# Patient Record
Sex: MALE | Race: WHITE | NOT HISPANIC OR LATINO | Employment: FULL TIME | URBAN - METROPOLITAN AREA
[De-identification: names, ages, dates, MRNs, and addresses within clinical notes are randomized per-mention and may not be internally consistent; named-entity substitution may affect disease eponyms.]

---

## 2017-11-17 ENCOUNTER — OFFICE VISIT (OUTPATIENT)
Dept: ORTHOPEDIC SURGERY | Facility: CLINIC | Age: 52
End: 2017-11-17

## 2017-11-17 VITALS
DIASTOLIC BLOOD PRESSURE: 98 MMHG | BODY MASS INDEX: 28.23 KG/M2 | WEIGHT: 220 LBS | HEIGHT: 74 IN | SYSTOLIC BLOOD PRESSURE: 127 MMHG | HEART RATE: 61 BPM

## 2017-11-17 DIAGNOSIS — I83.93 VARICOSE VEINS OF BOTH LOWER EXTREMITIES: ICD-10-CM

## 2017-11-17 DIAGNOSIS — M76.61 TENDONITIS, ACHILLES, RIGHT: Primary | ICD-10-CM

## 2017-11-17 PROCEDURE — 99203 OFFICE O/P NEW LOW 30 MIN: CPT | Performed by: PHYSICIAN ASSISTANT

## 2017-11-17 NOTE — PROGRESS NOTES
Patient: Oral Singleton  : 1965    Primary Care Provider: Holden Flores MD    Requesting Provider: As above    Pain of the Right Ankle      History    Chief Complaint: Right achilles pain    History of Present Illness: This is a very pleasant 52-year-old male presenting today to discuss his greater than 1 year history of right Achilles tendon pain.  He denies any trauma or injury.  He complains of pain with weightbearing and walking, better with rest.  He has stiffness when first getting up from a seated position and after driving long periods.  He reports occasional radiating pain up the calf.  He describes the pain as both sharp and throbbing.  He reports occasional warmth and erythema over the bump.  He had seen podiatry who recommended that he get a boot but he is unable to due to his work.  He has tried different types of shoe wear and inserts without any improved pain.  He is here looking for a plan to give him pain relief.        No Known Allergies  No current outpatient prescriptions on file prior to visit.     No current facility-administered medications on file prior to visit.      Social History     Social History   • Marital status: Unknown     Spouse name: N/A   • Number of children: N/A   • Years of education: N/A     Occupational History   • Not on file.     Social History Main Topics   • Smoking status: Never Smoker   • Smokeless tobacco: Never Used   • Alcohol use Yes      Comment: casual   • Drug use: No   • Sexual activity: Defer     Other Topics Concern   • Not on file     Social History Narrative   • No narrative on file     Past Surgical History:   Procedure Laterality Date   • SHOULDER SURGERY Left     rotator cuff repair     Family History   Problem Relation Age of Onset   • Cancer Mother    • Cancer Brother      Past Medical History:   Diagnosis Date   • No known problems          Review of Systems   Constitutional: Negative.    HENT: Negative.    Eyes: Negative.   "  Respiratory: Negative.    Cardiovascular: Negative.    Gastrointestinal: Negative.    Endocrine: Negative.    Genitourinary: Negative.    Musculoskeletal:        Ankle pain   Skin: Negative.    Allergic/Immunologic: Negative.    Neurological: Negative.    Hematological: Negative.    Psychiatric/Behavioral: Negative.        The following portions of the patient's history were reviewed and updated as appropriate: allergies, current medications, past family history, past medical history, past social history, past surgical history and problem list.    Objective   /98  Pulse 61  Ht 74\" (188 cm)  Wt 220 lb (99.8 kg)  BMI 28.25 kg/m2    Physical Exam:   GENERAL: Body habitus: normal weight for height    Lower extremity edema: Left: trace; Right: trace    Varicose veins:  Left: moderate; Right: moderate    Gait: antalgic     Mental Status:  awake and alert; oriented to person, place, and time    Voice:  clear  SKIN:  Normal    Hair Growth:  Right:normal; Left:  normal  HEENT: Head: Normocephalic, no lesions, without obvious abnormality.     Eyes: sclera anicteric  PULM:  Repiratory effort normal    Ortho Exam  V:  Dorsalis Pedis:  Right: 2+; Left:2+    Posterior Tibial: Right:2+; Left:2+    Capillary Refill:  Brisk  MSK:  Hand:right handed      Tibia:  Right:  non tender; Left:  non tender      Ankle:  Right: tender over the insertion of the achilles tendon, ROM  normal and motor function  normal; Left:  non tender, ROM  normal and motor function  normal      Foot:  Right:  non tender; Left:  non tender      NEURO: Heel Walking:  Right:  normal; Left:  normal    Toe Walking:  Right:  normal; Left:  normal     [place] 5.07 monofilament test: normal    Lower extremity sensation: intact     Calf Atrophy:none    Motor Function: all 5/5          Medical Decision Making    Data Review:   none    Assessment and Plan/ Diagnosis/Treatment options:   1.  Right insertional Achilles tendonitis, retrocalcaneal " bursitis for more than a year treated with shoe wear change without improved pain.  I have explained the problem to the patient in detail.  It is generally thought to be an overuse syndrome or due to chronic aging change.  I explained that it is NOT due to a “spur”.  The osteophyte (“spur”) often visible on x-ray is not the source of the problem: Many, many people have been same x-ray finding and did not have Achilles pain.  The problem causing the pain is the chronic tendinitis, inflammation, wear and tear of the tendon where it goes into the bone.    The bump on the heel NOT go away, the goal of the treatment is to have pain resolve.    Literature shows it is best treated with a 12 week course of physical therapy and night splinting.  I have shown the patient the 3 stretches to do at home, and recommend they do them 5 reps, 6-8 times per day.  I explained that injections and orthotics will not help.  I wrote a prescription for physical therapy, and we explained where to obtain a night splint..    Follow-up in 4 months if not improved.    If not improved, I explained the next step is a short leg walking cast for 6 weeks.    Surgery is a last resort as it is only helpful in about 60% of the time in improving the pain from this problem.    Patient will begin PT, stretching and night splinting and return for casting if needed.    2.  Patient has varicose veins.  I explained how that can lead to swelling, increase pain, stiffness, tiredness, cramping.  It can lead to chronic skin changes, sores on the leg.  I would recommend bilateral knee high low to moderate pressure support stockings.  We discussed sources and types.  He has a long family history of varicose veins and complications from them putting him at increased risk.        I examined the patient and discussed the plan with Ms Jaime, patient and his wife. LTD

## 2017-12-13 ENCOUNTER — HOSPITAL ENCOUNTER (OUTPATIENT)
Dept: PHYSICAL THERAPY | Facility: HOSPITAL | Age: 52
Setting detail: THERAPIES SERIES
Discharge: HOME | End: 2017-12-13

## 2017-12-13 DIAGNOSIS — M76.61 TENDONITIS, ACHILLES, RIGHT: Primary | ICD-10-CM

## 2017-12-13 PROCEDURE — 97161 PT EVAL LOW COMPLEX 20 MIN: CPT | Performed by: PHYSICAL THERAPIST

## 2017-12-13 PROCEDURE — 97140 MANUAL THERAPY 1/> REGIONS: CPT | Performed by: PHYSICAL THERAPIST

## 2017-12-13 NOTE — THERAPY EVALUATION
Outpatient Physical Therapy Ortho Initial Evaluation  [facility]     Patient Name: Oral Singleton  : 1965  MRN: [id number]  Today's Date: 2017      Visit Date: 2017    Patient Active Problem List   Diagnosis   • Tendonitis, Achilles, right   • Varicose veins of both lower extremities        Past Medical History:   Diagnosis Date   • No known problems         Past Surgical History:   Procedure Laterality Date   • SHOULDER SURGERY Left     rotator cuff repair       Visit Dx:     ICD-10-CM ICD-9-CM   1. Tendonitis, Achilles, right M76.61 726.71             Patient History       17 0900          History    Chief Complaint Pain  -LILLIAN      Type of Pain Ankle pain   Right Achilles  -LILLIAN      Date Current Problem(s) Began 16  -LILLIAN      Brief Description of Current Complaint This 52 year-old male reports at least a one year history of R Achilles pain  -LILLIAN      Onset Date- PT 17  -LILLIAN      Patient/Caregiver Goals Relieve pain  -LILLIAN      Hand Dominance right-handed  -LILLIAN      Occupation/sports/leisure activities Pt. owns garage door business.  He used to run but had to quit due to symptoms.  -LILLIAN      How has patient tried to help current problem? Night splint for the past month  -LILLIAN      Pain     Pain Location Ankle   R Achilles tendon  -LILLIAN      Pain at Present 4  -LILLIAN      Pain at Best 2  -LILLIAN      Pain at Worst 9  -LILLIAN      What Performance Factors Make the Current Problem(s) WORSE? work, lifting, climbing ladders  -LILLIAN      Daily Activities    Primary Language English  -LILLIAN      Teaching needs identified Home Exercise Program;Management of Condition  -LILLIAN      Pt Participated in POC and Goals Yes  -LILLIAN      Safety    Are you being hurt, hit, or frightened by anyone at home or in your life? No  -LILLIAN      Are you being neglected by a caregiver No  -LILLIAN        User Key  (r) = Recorded By, (t) = Taken By, (c) = Cosigned By    Initials Name Provider Type    LILLIAN Perkins PT Physical Therapist                 PT Ortho       12/13/17 1100    Foot/Ankle Palpation    Achilles' Tendon Right:;Tender;Swollen  -LILLIAN    ROM (Range of Motion)    General ROM Detail Bilateral ankle ROM and claf flexibility are WNL's.  -LILLIAN    MMT (Manual Muscle Testing)    General MMT Assessment Detail Bilateral ankle strength is WNL's.  -LILLIAN    Lower Extremity Flexibility    Gastrocnemius --   +6 degrees bilaterally  -LILLIAN      User Key  (r) = Recorded By, (t) = Taken By, (c) = Cosigned By    Initials Name Provider Type    LILLIAN Perkins PT Physical Therapist                      Therapy Education  Given: HEP  Program: New  How Provided: Verbal, Demonstration, Written  Provided to: Patient  Level of Understanding: Demonstrated, Verbalized           PT OP Goals       12/13/17 1100       PT Short Term Goals    STG Date to Achieve 01/10/18  -LILLIAN     STG 1 Pt. demonstrates independence in HEP.  -LILLIAN     STG 2 Pt. reports reduction in pain frequency and intensity compared to baseline levels.  -LILLIAN     Long Term Goals    LTG Date to Achieve 02/07/18  -LILLIAN     LTG 1 Pt. is independent in ongoing self-management of remaining symptoms.  -LILLIAN     LTG 2 Achilles pain is occasional and mild.  -LILLIAN     LTG 3 LEFS score is improved to >60/80.  -LILLIAN     Time Calculation    PT Goal Re-Cert Due Date 03/13/18  -LILLIAN       User Key  (r) = Recorded By, (t) = Taken By, (c) = Cosigned By    Initials Name Provider Type    LILLIAN Perkins PT Physical Therapist                PT Assessment/Plan       12/13/17 1140       PT Assessment    Functional Limitations Limitations in functional capacity and performance  -LILLIAN     Impairments Pain;Edema;Gait  -LILLIAN     Assessment Comments Pt. presents with chronic R Achilles tendinitis affecting functional abilities.  He will benefit from PT intervention to address pain, swelling, and functional limitations.  -LILLIAN     Please refer to paper survey for additional self-reported information Yes  -LILLIAN     Rehab Potential Good  -LILLIAN      Patient/caregiver participated in establishment of treatment plan and goals Yes  -LILLIAN     Patient would benefit from skilled therapy intervention Yes  -LILLIAN     PT Plan    PT Frequency 2x/week  -LILLIAN     Predicted Duration of Therapy Intervention (days/wks) 24 visits per MD recommendation  -LILLIAN     Planned CPT's? PT EVAL LOW COMPLEXITY: 84705;PT ULTRASOUND EA 15 MIN: 88860;PT MANUAL THERAPY EA 15 MIN: 09565;PT NEUROMUSC RE-EDUCATION EA 15 MIN: 88391;PT THER PROC EA 15 MIN: 42276;PT IONTOPHORESIS EA 15 MIN: 07948;PT HOT/COLD PACK WC NONMCARE: 21614  -LILLIAN     PT Plan Comments PT 2x/week per POC.  -LILLIAN       User Key  (r) = Recorded By, (t) = Taken By, (c) = Cosigned By    Initials Name Provider Type    LILLIAN Perkins, ELIA Physical Therapist                Modalities       12/13/17 1100          Iontophoresis 42260    Rx Minutes 5   R lateral Achilles tendon at insertion.  -LILLIAN      Dexamethasone used Yes  -LILLIAN      Patch Type --   Activa extended wear  -LILLIAN        User Key  (r) = Recorded By, (t) = Taken By, (c) = Cosigned By    Initials Name Provider Type    LILLIAN Perkins, PT Physical Therapist             Manual Rx (last 36 hours)      Manual Treatments       12/13/17 1100          Manual Rx 1    Manual Rx 1 Location R calf and ankle  -LILLIAN      Manual Rx 1 Type Astym  -LILLIAN        User Key  (r) = Recorded By, (t) = Taken By, (c) = Cosigned By    Initials Name Provider Type    LILLIAN Perkins PT Physical Therapist                      Outcome Measure Options: Lower Extremity Functional Scale (LEFS)  Lower Extremity Functional Index  Any of your usual work, housework or school activities: Moderate difficulty  Your usual hobbies, recreational or sporting activities: A little bit of difficulty  Getting into or out of the bath: No difficulty  Walking between rooms: No difficulty  Putting on your shoes or socks: No difficulty  Squatting: No difficulty  Lifting an object, like a bag of groceries from the floor: No  difficulty  Performing light activities around your home: A little bit of difficulty  Performing heavy activities around your home: A little bit of difficulty  Getting into or out of a car: No difficulty  Walking 2 blocks: Quite a bit of difficulty  Walking a mile: Quite a bit of difficulty  Going up or down 10 stairs (about 1 flight of stairs): A little bit of difficulty  Standing for 1 hour: No difficulty  Sitting for 1 hour: No difficulty  Running on even ground: Quite a bit of difficulty  Running on uneven ground: Quite a bit of difficulty  Making sharp turns while running fast: Quite a bit of difficulty  Hopping: Quite a bit of difficulty  Rolling over in bed: No difficulty  Total: 56      Time Calculation:   Start Time: 0945  Total Timed Code Minutes- PT: 15 minute(s)     Therapy Charges for Today     Code Description Service Date Service Provider Modifiers Qty    82885887224 HC PT EVAL LOW COMPLEXITY 2 12/13/2017 Neris Perkins, PT GP 1    71229226077 HC PT MANUAL THERAPY EA 15 MIN 12/13/2017 Neris Perkins, PT GP 1          PT G-Codes  Outcome Measure Options: Lower Extremity Functional Scale (LEFS)         Neris Perkins PT  12/13/2017

## 2017-12-15 ENCOUNTER — HOSPITAL ENCOUNTER (OUTPATIENT)
Dept: PHYSICAL THERAPY | Facility: HOSPITAL | Age: 52
Setting detail: THERAPIES SERIES
Discharge: HOME | End: 2017-12-15

## 2017-12-15 DIAGNOSIS — M76.61 TENDONITIS, ACHILLES, RIGHT: Primary | ICD-10-CM

## 2017-12-15 PROCEDURE — 97110 THERAPEUTIC EXERCISES: CPT | Performed by: PHYSICAL THERAPIST

## 2017-12-15 PROCEDURE — 97010 HOT OR COLD PACKS THERAPY: CPT | Performed by: PHYSICAL THERAPIST

## 2017-12-15 PROCEDURE — 97140 MANUAL THERAPY 1/> REGIONS: CPT | Performed by: PHYSICAL THERAPIST

## 2017-12-15 NOTE — THERAPY TREATMENT NOTE
Outpatient Physical Therapy Ortho Treatment Note  [facility]     Patient Name: Oral Singleton  : 1965  MRN: [id number]  Today's Date: 12/15/2017      Visit Date: 12/15/2017    Visit Dx:    ICD-10-CM ICD-9-CM   1. Tendonitis, Achilles, right M76.61 726.71       Patient Active Problem List   Diagnosis   • Tendonitis, Achilles, right   • Varicose veins of both lower extremities        Past Medical History:   Diagnosis Date   • No known problems         Past Surgical History:   Procedure Laterality Date   • SHOULDER SURGERY Left     rotator cuff repair             PT Ortho       12/15/17 1000    Subjective Comments    Subjective Comments Pt. did a lot yesterday.  He reports soreness in his R Achilles this morning.  -LILLIAN    Subjective Pain    Able to rate subjective pain? yes  -LILLIAN    Pre-Treatment Pain Level 6  -LILLIAN      17 1100    Foot/Ankle Palpation    Achilles' Tendon Right:;Tender;Swollen  -LILLIAN    ROM (Range of Motion)    General ROM Detail Bilateral ankle ROM and claf flexibility are WNL's.  -LILLIAN    MMT (Manual Muscle Testing)    General MMT Assessment Detail Bilateral ankle strength is WNL's.  -LILLIAN    Lower Extremity Flexibility    Gastrocnemius --   +6 degrees bilaterally  -LILLIAN      User Key  (r) = Recorded By, (t) = Taken By, (c) = Cosigned By    Initials Name Provider Type    LILLIAN Perkins PT Physical Therapist                            PT Assessment/Plan       12/15/17 1000       PT Assessment    Assessment Comments No significant change in symptoms yet.  -LILLIAN     PT Plan    PT Plan Comments Continue PT.  -LILLIAN       User Key  (r) = Recorded By, (t) = Taken By, (c) = Cosigned By    Initials Name Provider Type    LILLIAN Perkins PT Physical Therapist                Modalities       12/15/17 1000          Ice    Ice Applied Yes  -LILLIAN      Location R Achilles, lateral insertion  -LILLIAN      Rx Minutes --   5 minutes  -LILLIAN      Iontophoresis 48115    Rx Minutes 5  -LILLIAN      Dexamethasone used  Yes  -LILLIAN      Patch Type --   Activa  -LILLIAN        User Key  (r) = Recorded By, (t) = Taken By, (c) = Cosigned By    Initials Name Provider Type    LILLIAN Perkins PT Physical Therapist                Exercises       12/15/17 1000          Subjective Comments    Subjective Comments Pt. did a lot yesterday.  He reports soreness in his R Achilles this morning.  -LILLIAN      Subjective Pain    Able to rate subjective pain? yes  -LILLIAN      Pre-Treatment Pain Level 6  -LILLIAN      Exercise 1    Exercise Name 1 No changes to HEP.  Pt. will continue with stretching.  Provided 1 cm. heel wedges for each shoe in an effort to reduce repetitive stress on Achilles tendon through the day.  Active warm-up on bicycle prior to Astym.  -LILLIAN        User Key  (r) = Recorded By, (t) = Taken By, (c) = Cosigned By    Initials Name Provider Type    LILLIAN Perkins PT Physical Therapist                        Manual Rx (last 36 hours)      Manual Treatments       12/15/17 0900          Manual Rx 1    Manual Rx 1 Location R calf and ankle  -LILLIAN      Manual Rx 1 Type Astym   Provided written info. regarding Astym.  -LILLIAN        User Key  (r) = Recorded By, (t) = Taken By, (c) = Cosigned By    Initials Name Provider Type    LILLIAN Perkins PT Physical Therapist                             Time Calculation:   Start Time: 1015  Total Timed Code Minutes- PT: 25 minute(s)    Therapy Charges for Today     Code Description Service Date Service Provider Modifiers Qty    1966640 HC PT HOT/COLD PACK WC NONMCARE 12/15/2017 Neris Perkins, PT GP 1    81512841250 HC PT MANUAL THERAPY EA 15 MIN 12/15/2017 Neris Perkins PT GP 1    42815912889 HC PT THER PROC EA 15 MIN 12/15/2017 Neris Perkins PT GP 1                    Neris Perkins PT  12/15/2017

## 2017-12-20 ENCOUNTER — HOSPITAL ENCOUNTER (OUTPATIENT)
Dept: PHYSICAL THERAPY | Facility: HOSPITAL | Age: 52
Setting detail: THERAPIES SERIES
Discharge: HOME | End: 2017-12-20

## 2017-12-20 DIAGNOSIS — M76.61 TENDONITIS, ACHILLES, RIGHT: Primary | ICD-10-CM

## 2017-12-20 PROCEDURE — 97140 MANUAL THERAPY 1/> REGIONS: CPT | Performed by: PHYSICAL THERAPIST

## 2017-12-20 NOTE — THERAPY TREATMENT NOTE
Outpatient Physical Therapy Ortho Treatment Note  [facility]     Patient Name: Oral Singleton  : 1965  MRN: [id number]  Today's Date: 2017      Visit Date: 2017    Visit Dx:    ICD-10-CM ICD-9-CM   1. Tendonitis, Achilles, right M76.61 726.71       Patient Active Problem List   Diagnosis   • Tendonitis, Achilles, right   • Varicose veins of both lower extremities        Past Medical History:   Diagnosis Date   • No known problems         Past Surgical History:   Procedure Laterality Date   • SHOULDER SURGERY Left     rotator cuff repair             PT Ortho       17 1400    Subjective Comments    Subjective Comments Not as bad today.  -LILLIAN    Subjective Pain    Able to rate subjective pain? yes  -LILLIAN    Pre-Treatment Pain Level 5  -LILLIAN    Post-Treatment Pain Level 5  -LILLIAN      User Key  (r) = Recorded By, (t) = Taken By, (c) = Cosigned By    Initials Name Provider Type    LILLIAN Perkins, PT Physical Therapist                            PT Assessment/Plan       17 1400       PT Assessment    Assessment Comments Slight decrease in pain intensity noted today.    -LILLIAN     PT Plan    PT Plan Comments Continue PT.  -LILLIAN       User Key  (r) = Recorded By, (t) = Taken By, (c) = Cosigned By    Initials Name Provider Type    LILLIAN Perkins, PT Physical Therapist                Modalities       17 1400          Iontophoresis 51759    Rx Minutes 5  -LILLIAN      Dexamethasone used Yes  -LILLIAN      Patch Type --   Activa  -LILLIAN        User Key  (r) = Recorded By, (t) = Taken By, (c) = Cosigned By    Initials Name Provider Type    LILLIAN Perkins, PT Physical Therapist                Exercises       17 1400          Subjective Comments    Subjective Comments Not as bad today.  -LILLIAN      Subjective Pain    Able to rate subjective pain? yes  -LILLIAN      Pre-Treatment Pain Level 5  -LILLIAN      Post-Treatment Pain Level 5  -LILLIAN      Exercise 1    Exercise Name 1 Instructed pt. in calcaneal  "rocking to reduce strain on Achilles insertion.  Also instructed in \"muscle bending\" at tendinous portion of Achilles.  -LILLIAN        User Key  (r) = Recorded By, (t) = Taken By, (c) = Cosigned By    Initials Name Provider Type    LILLIAN Perkins PT Physical Therapist                        Manual Rx (last 36 hours)      Manual Treatments       12/20/17 1300          Manual Rx 1    Manual Rx 1 Location R calf and ankle  -LILLIAN      Manual Rx 1 Type Astym and STM  -LILLIAN        User Key  (r) = Recorded By, (t) = Taken By, (c) = Cosigned By    Initials Name Provider Type    LILLIAN Perkins PT Physical Therapist                             Time Calculation:   Start Time: 1415  Total Timed Code Minutes- PT: 25 minute(s)    Therapy Charges for Today     Code Description Service Date Service Provider Modifiers Qty    72513325447  PT MANUAL THERAPY EA 15 MIN 12/20/2017 Neris Perkins PT GP 2                    Neris Perkins PT  12/20/2017     "

## 2018-01-02 ENCOUNTER — HOSPITAL ENCOUNTER (OUTPATIENT)
Dept: PHYSICAL THERAPY | Facility: HOSPITAL | Age: 53
Setting detail: THERAPIES SERIES
Discharge: HOME | End: 2018-01-02

## 2018-01-02 DIAGNOSIS — M76.61 TENDONITIS, ACHILLES, RIGHT: Primary | ICD-10-CM

## 2018-01-02 PROCEDURE — 97140 MANUAL THERAPY 1/> REGIONS: CPT | Performed by: PHYSICAL THERAPIST

## 2018-01-02 PROCEDURE — 97010 HOT OR COLD PACKS THERAPY: CPT | Performed by: PHYSICAL THERAPIST

## 2018-01-02 NOTE — THERAPY TREATMENT NOTE
Outpatient Physical Therapy Ortho Treatment Note  [facility]     Patient Name: Oral Singleton  : 1965  MRN: [id number]  Today's Date: 2018      Visit Date: 2018    Visit Dx:    ICD-10-CM ICD-9-CM   1. Tendonitis, Achilles, right M76.61 726.71       Patient Active Problem List   Diagnosis   • Tendonitis, Achilles, right   • Varicose veins of both lower extremities        Past Medical History:   Diagnosis Date   • No known problems         Past Surgical History:   Procedure Laterality Date   • SHOULDER SURGERY Left     rotator cuff repair             PT Ortho       18 1600    Subjective Comments    Subjective Comments Pt. did a lot of walking during .  This included city walking as well as rugged terrain.  He reports no significant change in symptoms.  -LILLIAN    Subjective Pain    Able to rate subjective pain? yes  -LILLIAN    Pre-Treatment Pain Level 6  -LILLIAN    Post-Treatment Pain Level 6  -LILLIAN      User Key  (r) = Recorded By, (t) = Taken By, (c) = Cosigned By    Initials Name Provider Type    LILLIAN Perkins PT Physical Therapist                            PT Assessment/Plan       18 1600       PT Assessment    Assessment Comments Symptom intensity essentially unchanged, after extensive walking while on vacation.  -LILLIAN     PT Plan    PT Plan Comments Continue PT.  -LILLIAN       User Key  (r) = Recorded By, (t) = Taken By, (c) = Cosigned By    Initials Name Provider Type    LILLIAN Perkins PT Physical Therapist                Modalities       18 1600          Ice    Ice Applied Yes   ice massage  -LILLIAN      Location R Achilles, lateral insertion  -LILLIAN      Iontophoresis 14587    Rx Minutes 5  -LILLIAN      Dexamethasone used Yes  -LILLIAN      Patch Type --   Activa  -LILLIAN        User Key  (r) = Recorded By, (t) = Taken By, (c) = Cosigned By    Initials Name Provider Type    LILLIAN Perkins PT Physical Therapist                Exercises       18 1600           Subjective Comments    Subjective Comments Pt. did a lot of walking during [place] break.  This included city walking as well as rugged terrain.  He reports no significant change in symptoms.  -LILLIAN      Subjective Pain    Able to rate subjective pain? yes  -LILLIAN      Pre-Treatment Pain Level 6  -LILLIAN      Post-Treatment Pain Level 6  -LILLIAN        User Key  (r) = Recorded By, (t) = Taken By, (c) = Cosigned By    Initials Name Provider Type    LILLIAN Perkins PT Physical Therapist                        Manual Rx (last 36 hours)      Manual Treatments       01/02/18 1500          Manual Rx 1    Manual Rx 1 Location R calf and ankle  -LILLIAN      Manual Rx 1 Type Astym and STM  -LILLIAN        User Key  (r) = Recorded By, (t) = Taken By, (c) = Cosigned By    Initials Name Provider Type    LILLIAN Perkins PT Physical Therapist                             Time Calculation:   Start Time: 1530  Total Timed Code Minutes- PT: 15 minute(s)    Therapy Charges for Today     Code Description Service Date Service Provider Modifiers Qty    47436833844 HC PT MANUAL THERAPY EA 15 MIN 1/2/2018 Neris Perkins, PT GP 1    67898594373 HC PT HOT/COLD PACK WC NONMCARE 1/2/2018 Neris Perkins PT GP 1                    Neris Perkins PT  1/2/2018

## 2018-01-04 ENCOUNTER — HOSPITAL ENCOUNTER (OUTPATIENT)
Dept: PHYSICAL THERAPY | Facility: HOSPITAL | Age: 53
Setting detail: THERAPIES SERIES
Discharge: HOME | End: 2018-01-04

## 2018-01-04 DIAGNOSIS — M76.61 TENDONITIS, ACHILLES, RIGHT: Primary | ICD-10-CM

## 2018-01-04 PROCEDURE — 97010 HOT OR COLD PACKS THERAPY: CPT | Performed by: PHYSICAL THERAPIST

## 2018-01-04 PROCEDURE — 97140 MANUAL THERAPY 1/> REGIONS: CPT | Performed by: PHYSICAL THERAPIST

## 2018-01-04 NOTE — THERAPY TREATMENT NOTE
Outpatient Physical Therapy Ortho Treatment Note  [facility]     Patient Name: Oral Singleton  : 1965  MRN: [id number]  Today's Date: 2018      Visit Date: 2018    Visit Dx:    ICD-10-CM ICD-9-CM   1. Tendonitis, Achilles, right M76.61 726.71       Patient Active Problem List   Diagnosis   • Tendonitis, Achilles, right   • Varicose veins of both lower extremities        Past Medical History:   Diagnosis Date   • No known problems         Past Surgical History:   Procedure Laterality Date   • SHOULDER SURGERY Left     rotator cuff repair             PT Ortho       18 1600    Subjective Comments    Subjective Comments Pt. has been working long days in cold temperatures, but he reports reduction in pain intensity today compared to previous sessions.  -LILLIAN    Subjective Pain    Able to rate subjective pain? yes  -LILLIAN    Pre-Treatment Pain Level 4  -LILLIAN    Post-Treatment Pain Level 4  -LILLIAN      18 1600    Subjective Comments    Subjective Comments Pt. did a lot of walking during  break.  This included city walking as well as rugged terrain.  He reports no significant change in symptoms.  -LILLIAN    Subjective Pain    Able to rate subjective pain? yes  -LILLIAN    Pre-Treatment Pain Level 6  -LILLIAN    Post-Treatment Pain Level 6  -LILLIAN      User Key  (r) = Recorded By, (t) = Taken By, (c) = Cosigned By    Initials Name Provider Type    LILLIAN Perkins PT Physical Therapist                            PT Assessment/Plan       18 1600       PT Assessment    Assessment Comments Decreased intensity of pain reported today.  -LILLIAN     PT Plan    PT Plan Comments Continue PT.  -LILLIAN       User Key  (r) = Recorded By, (t) = Taken By, (c) = Cosigned By    Initials Name Provider Type    LILLIAN Perkins PT Physical Therapist                Modalities       18 1600          Moist Heat    MH Applied Yes  -LILLIAN      Location R Achilles and lower calf  -LILLIAN      Rx Minutes 10 mins  -LILLIAN       Iontophoresis 30186    Rx Minutes 5  -LILLIAN      Dexamethasone used Yes  -LILLIAN      Patch Type --   Activa  -LILLIAN        User Key  (r) = Recorded By, (t) = Taken By, (c) = Cosigned By    Initials Name Provider Type    LILLIAN Perkins PT Physical Therapist                Exercises       01/04/18 1600          Subjective Comments    Subjective Comments Pt. has been working long days in cold temperatures, but he reports reduction in pain intensity today compared to previous sessions.  -LILLIAN      Subjective Pain    Able to rate subjective pain? yes  -LILLIAN      Pre-Treatment Pain Level 4  -LILLIAN      Post-Treatment Pain Level 4  -LILLIAN        User Key  (r) = Recorded By, (t) = Taken By, (c) = Cosigned By    Initials Name Provider Type    LILLIAN Perkins PT Physical Therapist                        Manual Rx (last 36 hours)      Manual Treatments       01/04/18 1500          Manual Rx 1    Manual Rx 1 Location R calf and ankle  -LILLIAN      Manual Rx 1 Type Astym and STM  -LILLIAN        User Key  (r) = Recorded By, (t) = Taken By, (c) = Cosigned By    Initials Name Provider Type    LILLIAN Perkins PT Physical Therapist                             Time Calculation:   Start Time: 1535  Total Timed Code Minutes- PT: 15 minute(s)    Therapy Charges for Today     Code Description Service Date Service Provider Modifiers Qty    15350343959 HC PT MANUAL THERAPY EA 15 MIN 1/4/2018 Neris Perkins, PT GP 1    66802645324 HC PT HOT/COLD PACK WC NONMCARE 1/4/2018 Neris Perkins, PT GP 1                    Neris Perkins PT  1/4/2018

## 2018-01-11 ENCOUNTER — HOSPITAL ENCOUNTER (OUTPATIENT)
Dept: PHYSICAL THERAPY | Facility: HOSPITAL | Age: 53
Setting detail: THERAPIES SERIES
Discharge: HOME | End: 2018-01-11

## 2018-01-11 DIAGNOSIS — M76.61 TENDONITIS, ACHILLES, RIGHT: Primary | ICD-10-CM

## 2018-01-11 PROCEDURE — 97140 MANUAL THERAPY 1/> REGIONS: CPT | Performed by: PHYSICAL THERAPIST

## 2018-01-11 PROCEDURE — 97110 THERAPEUTIC EXERCISES: CPT | Performed by: PHYSICAL THERAPIST

## 2018-01-11 PROCEDURE — 97010 HOT OR COLD PACKS THERAPY: CPT | Performed by: PHYSICAL THERAPIST

## 2018-01-11 NOTE — THERAPY PROGRESS REPORT/RE-CERT
"    Outpatient Physical Therapy Ortho Re-Assessment  [facility]     Patient Name: Oral Singleton  : 1965  MRN: [id number]  Today's Date: 2018      Visit Date: 2018    Patient Active Problem List   Diagnosis   • Tendonitis, Achilles, right   • Varicose veins of both lower extremities        Past Medical History:   Diagnosis Date   • No known problems         Past Surgical History:   Procedure Laterality Date   • SHOULDER SURGERY Left     rotator cuff repair       Visit Dx:     ICD-10-CM ICD-9-CM   1. Tendonitis, Achilles, right M76.61 726.71             Patient History       18 1600          Pain     Pain at Present 5  -LILLIAN      Pain at Best 3  -LILLIAN      Pain at Worst 7  -LILLIAN        User Key  (r) = Recorded By, (t) = Taken By, (c) = Cosigned By    Initials Name Provider Type    LILLIAN Perkins, ELIA Physical Therapist                PT Ortho       18 1600    Subjective Comments    Subjective Comments Pt. notices some improvement.  Pain can still get pretty intense depending on what he is doing.  He says he does not always get around to doing his stretches.  \"it is the last thing I want to do when I get home from work.\"  Morning are too hectic to get them done.  -LILLIAN    Subjective Pain    Able to rate subjective pain? yes  -LILLIAN    Pre-Treatment Pain Level 5  -LILLIAN    Post-Treatment Pain Level 4  -LILLIAN      User Key  (r) = Recorded By, (t) = Taken By, (c) = Cosigned By    Initials Name Provider Type    LILLIAN Perkins PT Physical Therapist                      Therapy Education  Given: HEP  Program: Progressed  How Provided: Verbal, Demonstration, Written  Provided to: Patient  Level of Understanding: Verbalized, Demonstrated           PT OP Goals       18 1600       PT Short Term Goals    STG Date to Achieve 01/10/18  -LILLIAN     STG 1 Pt. demonstrates independence in HEP.  -LILLIAN     STG 1 Progress Partially Met  -LILLIAN     STG 2 Pt. reports reduction in pain frequency and intensity " compared to baseline levels.  -LILLIAN     STG 2 Progress Progressing;Ongoing  -LILLIAN     Long Term Goals    LTG Date to Achieve 02/07/18  -     LTG 1 Pt. is independent in ongoing self-management of remaining symptoms.  -LILLIAN     LTG 1 Progress Ongoing  -LILLIAN     LTG 2 Achilles pain is occasional and mild.  -LILLIAN     LTG 2 Progress Ongoing  -LILLIAN     LTG 3 LEFS score is improved to >60/80.  -LILLIAN     LTG 3 Progress Ongoing  -       User Key  (r) = Recorded By, (t) = Taken By, (c) = Cosigned By    Initials Name Provider Type    LILLIAN Perkins PT Physical Therapist                PT Assessment/Plan       01/11/18 1655       PT Assessment    Functional Limitations Limitations in functional capacity and performance  -LILLIAN     Impairments Pain;Edema;Gait  -LILLIAN     Assessment Comments Pt. notices some improvement with pain becoming less intense at times, but can still be intense depending on level of activity at work.  -     Please refer to paper survey for additional self-reported information Yes  -LILLIAN     Rehab Potential Good  -LILLIAN     Patient/caregiver participated in establishment of treatment plan and goals Yes  -LILLIAN     Patient would benefit from skilled therapy intervention Yes  -LILLIAN     PT Plan    PT Frequency 2x/week  -LILLIAN     Predicted Duration of Therapy Intervention (days/wks) 16 visits  -LILLIAN     Planned CPT's? PT EVAL LOW COMPLEXITY: 30548;PT ULTRASOUND EA 15 MIN: 53843;PT MANUAL THERAPY EA 15 MIN: 19095;PT NEUROMUSC RE-EDUCATION EA 15 MIN: 66612;PT THER PROC EA 15 MIN: 39578;PT IONTOPHORESIS EA 15 MIN: 06527;PT HOT/COLD PACK WC NONMCARE: 17360  -LILLIAN     PT Plan Comments Continue PT.    -       User Key  (r) = Recorded By, (t) = Taken By, (c) = Cosigned By    Initials Name Provider Type    LILLIAN Perkins PT Physical Therapist                Modalities       01/11/18 1600          Ice    Ice Applied Yes   ice massage  -      Location R Achilles, lateral insertion  -      Iontophoresis 47562    Rx Minutes 5   R  "Achilles insertion  -LILLIAN      Dexamethasone used Yes  -LILLIAN      Patch Type --   Activa  -LILLIAN        User Key  (r) = Recorded By, (t) = Taken By, (c) = Cosigned By    Initials Name Provider Type    LILLIAN Perkins PT Physical Therapist              Exercises       01/11/18 1600          Subjective Comments    Subjective Comments Pt. notices some improvement.  Pain can still get pretty intense depending on what he is doing.  He says he does not always get around to doing his stretches.  \"it is the last thing I want to do when I get home from work.\"  Morning are too hectic to get them done.  -LILLIAN      Subjective Pain    Able to rate subjective pain? yes  -LILLIAN      Pre-Treatment Pain Level 5  -LILLIAN      Post-Treatment Pain Level 4  -LILLIAN      Exercise 1    Exercise Name 1 Brief reassessment and addition of standing gastroc and soleus stretches.  Pt. is encouraged to do these intermittently through the day.  -LILLIAN        User Key  (r) = Recorded By, (t) = Taken By, (c) = Cosigned By    Initials Name Provider Type    LILLIAN Perkins PT Physical Therapist                        Outcome Measure Options: Lower Extremity Functional Scale (LEFS)  Lower Extremity Functional Index  Any of your usual work, housework or school activities: A little bit of difficulty  Your usual hobbies, recreational or sporting activities: Moderate difficulty  Getting into or out of the bath: No difficulty  Walking between rooms: No difficulty  Putting on your shoes or socks: No difficulty  Squatting: No difficulty  Lifting an object, like a bag of groceries from the floor: No difficulty  Performing light activities around your home: No difficulty  Performing heavy activities around your home: Moderate difficulty  Getting into or out of a car: No difficulty  Walking 2 blocks: Moderate difficulty  Walking a mile: Moderate difficulty  Going up or down 10 stairs (about 1 flight of stairs): Moderate difficulty  Standing for 1 hour: Moderate " difficulty  Sitting for 1 hour: A little bit of difficulty  Running on even ground: Extreme difficulty or unable to perform activity  Running on uneven ground: Extreme difficulty or unable to perform activity  Making sharp turns while running fast: Extreme difficulty or unable to perform activity  Hopping: Extreme difficulty or unable to perform activity  Rolling over in bed: A little bit of difficulty  Total: 49      Time Calculation:   Start Time: 1535  Total Timed Code Minutes- PT: 30 minute(s)     Therapy Charges for Today     Code Description Service Date Service Provider Modifiers Qty    03826029291 HC PT THER PROC EA 15 MIN 1/11/2018 Neris Perkins, PT GP 1    21547688609 HC PT MANUAL THERAPY EA 15 MIN 1/11/2018 Neris Perkins, PT GP 1    33167964982 HC PT HOT/COLD PACK WC NONMCARE 1/11/2018 Neris Perkins, PT GP 1          PT G-Codes  Outcome Measure Options: Lower Extremity Functional Scale (LEFS)         Neris Pekrins, PT  1/11/2018

## 2018-01-18 ENCOUNTER — HOSPITAL ENCOUNTER (OUTPATIENT)
Dept: PHYSICAL THERAPY | Facility: HOSPITAL | Age: 53
Setting detail: THERAPIES SERIES
Discharge: HOME | End: 2018-01-18

## 2018-01-18 DIAGNOSIS — M76.61 TENDONITIS, ACHILLES, RIGHT: Primary | ICD-10-CM

## 2018-01-18 PROCEDURE — 97010 HOT OR COLD PACKS THERAPY: CPT | Performed by: PHYSICAL THERAPIST

## 2018-01-18 PROCEDURE — 97140 MANUAL THERAPY 1/> REGIONS: CPT | Performed by: PHYSICAL THERAPIST

## 2018-01-18 NOTE — THERAPY TREATMENT NOTE
Outpatient Physical Therapy Ortho Initial Evaluation  [facility]     Patient Name: Oral Singleton  : 1965  MRN: [id number]  Today's Date: 2018      Visit Date: 2018    Patient Active Problem List   Diagnosis   • Tendonitis, Achilles, right   • Varicose veins of both lower extremities        Past Medical History:   Diagnosis Date   • No known problems         Past Surgical History:   Procedure Laterality Date   • SHOULDER SURGERY Left     rotator cuff repair       Visit Dx:     ICD-10-CM ICD-9-CM   1. Tendonitis, Achilles, right M76.61 726.71                                           PT Assessment/Plan       18 1600       PT Assessment    Assessment Comments Pt. is more consistent with performance of stretching exercises.  He is not wearing night splint because he kp it is painful.  He will see if he can adjust it so that it is tolerable during the night.  -LILLIAN     PT Plan    PT Plan Comments Continue PT.  -LILLIAN       User Key  (r) = Recorded By, (t) = Taken By, (c) = Cosigned By    Initials Name Provider Type    LILLIAN Perkins PT Physical Therapist                Modalities       18 1600          Ice    Ice Applied Yes   ice massage  -LILLIAN      Location R Achilles, lateral insertion  -LILLIAN      Iontophoresis 08122    Dexamethasone used Yes  -LILLIAN      Patch Type --   Activa  -LILLIAN        User Key  (r) = Recorded By, (t) = Taken By, (c) = Cosigned By    Initials Name Provider Type    LILLIAN Perkins PT Physical Therapist              Exercises       18 1600          Subjective Comments    Subjective Comments Pain is localized and persistent, but today, near end of work day, pain is mild to moderate.  -LILLIAN      Subjective Pain    Able to rate subjective pain? yes  -LILLIAN      Pre-Treatment Pain Level 4  -LILLIAN      Post-Treatment Pain Level 4  -LILLIAN        User Key  (r) = Recorded By, (t) = Taken By, (c) = Cosigned By    Initials Name Provider Type    LILLIAN Perkins PT  Physical Therapist           Manual Rx (last 36 hours)      Manual Treatments       01/18/18 1500          Manual Rx 1    Manual Rx 1 Location R calf and ankle  -LILLIAN      Manual Rx 1 Type Astym and STM  -LILLIAN      Manual Rx 2    Manual Rx 2 Location R calcaneus  -LILLIAN      Manual Rx 2 Type passive eversion to reduce strain on lateral insertion  -LILLIAN        User Key  (r) = Recorded By, (t) = Taken By, (c) = Cosigned By    Initials Name Provider Type    LILLIAN Perkins PT Physical Therapist                                Time Calculation:   Start Time: 1545  Total Timed Code Minutes- PT: 20 minute(s)     Therapy Charges for Today     Code Description Service Date Service Provider Modifiers Qty    66525808626 HC PT MANUAL THERAPY EA 15 MIN 1/18/2018 Neris Perkins, PT GP 1    45219924120 HC PT HOT/COLD PACK WC NONMCARE 1/18/2018 Neris Perkins, PT GP 1                    Neris Perkins, PT  1/18/2018

## 2018-01-23 ENCOUNTER — HOSPITAL ENCOUNTER (OUTPATIENT)
Dept: PHYSICAL THERAPY | Facility: HOSPITAL | Age: 53
Setting detail: THERAPIES SERIES
Discharge: HOME | End: 2018-01-23

## 2018-01-23 DIAGNOSIS — M76.61 TENDONITIS, ACHILLES, RIGHT: Primary | ICD-10-CM

## 2018-01-23 PROCEDURE — 97110 THERAPEUTIC EXERCISES: CPT | Performed by: PHYSICAL THERAPIST

## 2018-01-23 PROCEDURE — 97140 MANUAL THERAPY 1/> REGIONS: CPT | Performed by: PHYSICAL THERAPIST

## 2018-01-23 NOTE — THERAPY TREATMENT NOTE
Outpatient Physical Therapy Ortho Treatment Note  [facility]     Patient Name: Oral Singleton  : 1965  MRN: [id number]  Today's Date: 2018      Visit Date: 2018    Visit Dx:    ICD-10-CM ICD-9-CM   1. Tendonitis, Achilles, right M76.61 726.71       Patient Active Problem List   Diagnosis   • Tendonitis, Achilles, right   • Varicose veins of both lower extremities        Past Medical History:   Diagnosis Date   • No known problems         Past Surgical History:   Procedure Laterality Date   • SHOULDER SURGERY Left     rotator cuff repair             PT Ortho       18 1600    Subjective Comments    Subjective Comments Pt. says pain is back to original level of intensity.  He is frustrated.    -LILLIAN    Subjective Pain    Able to rate subjective pain? yes  -LILLIAN    Pre-Treatment Pain Level 6  -LILLIAN    Post-Treatment Pain Level 6  -LILLIAN      User Key  (r) = Recorded By, (t) = Taken By, (c) = Cosigned By    Initials Name Provider Type    LILLIAN Perkins PT Physical Therapist                            PT Assessment/Plan       18 1600       PT Assessment    Assessment Comments Pt. is wearing elastic bandage inside night splint to improve comfort.  Tape applied today was tolerable as pt. left clinic.  He will wear until next appointment later this week unless it becomes irritating.  -LILLIAN     PT Plan    PT Plan Comments Continue PT.  Assess effectiveness of taping and transition to more active exercise.  -LILLIAN       User Key  (r) = Recorded By, (t) = Taken By, (c) = Cosigned By    Initials Name Provider Type    LILLIAN Perkins, PT Physical Therapist                    Exercises       18 1600          Subjective Comments    Subjective Comments Pt. says pain is back to original level of intensity.  He is frustrated.    -LILLIAN      Subjective Pain    Able to rate subjective pain? yes  -LILLIAN      Pre-Treatment Pain Level 6  -LILLIAN      Post-Treatment Pain Level 6  -LILLIAN      Exercise 1     Exercise Name 1 Reviewed stretching.  Pt. indicates that he is stretching regularly, but also expresses concern about doing more damage.  Reinforced that stretching should be tolerable and sustained for at least 30 seconds per repetition.  Performed passive stretching in clinic today.  Ended with trial of taping to lateral Achilles to reduce strain at lateral insertion.  -LILLIAN        User Key  (r) = Recorded By, (t) = Taken By, (c) = Cosigned By    Initials Name Provider Type    LILLIAN Perkins PT Physical Therapist                        Manual Rx (last 36 hours)      Manual Treatments       01/23/18 1500          Manual Rx 1    Manual Rx 1 Location R Achilles insertion  -LILLIAN      Manual Rx 1 Type Astym  -LILLIAN      Manual Rx 2    Manual Rx 2 Location R calcaneus  -LILLIAN      Manual Rx 2 Type passive eversion to reduce strain on lateral insertion  -LILLIAN        User Key  (r) = Recorded By, (t) = Taken By, (c) = Cosigned By    Initials Name Provider Type    LILLIAN Perkins PT Physical Therapist                             Time Calculation:   Start Time: 1540  Total Timed Code Minutes- PT: 25 minute(s)    Therapy Charges for Today     Code Description Service Date Service Provider Modifiers Qty    17671181284  PT THER PROC EA 15 MIN 1/23/2018 Neris Perkins PT GP 1    [id number]  PT MANUAL THERAPY EA 15 MIN 1/23/2018 Neris Perkins PT GP 1                    Neris Perkins PT  1/23/2018

## 2018-01-25 ENCOUNTER — HOSPITAL ENCOUNTER (OUTPATIENT)
Dept: PHYSICAL THERAPY | Facility: HOSPITAL | Age: 53
Setting detail: THERAPIES SERIES
Discharge: HOME | End: 2018-01-25

## 2018-01-25 DIAGNOSIS — M76.61 TENDONITIS, ACHILLES, RIGHT: Primary | ICD-10-CM

## 2018-01-25 PROCEDURE — 97110 THERAPEUTIC EXERCISES: CPT | Performed by: PHYSICAL THERAPIST

## 2018-01-25 PROCEDURE — 97140 MANUAL THERAPY 1/> REGIONS: CPT | Performed by: PHYSICAL THERAPIST

## 2018-01-25 NOTE — THERAPY TREATMENT NOTE
"    Outpatient Physical Therapy Ortho Treatment Note  [facility]     Patient Name: Oral Singleton  : 1965  MRN: [id number]  Today's Date: 2018      Visit Date: 2018    Visit Dx:    ICD-10-CM ICD-9-CM   1. Tendonitis, Achilles, right M76.61 726.71       Patient Active Problem List   Diagnosis   • Tendonitis, Achilles, right   • Varicose veins of both lower extremities        Past Medical History:   Diagnosis Date   • No known problems         Past Surgical History:   Procedure Laterality Date   • SHOULDER SURGERY Left     rotator cuff repair             PT Ortho       18 1500    Subjective Comments    Subjective Comments Pain back to 4/10 today and it is \"not that throbbing pain\" like it was 2 days ago.  Pt. has resumed wearing night splint and has also tried wearing walking boot when he can.  He is not wearing work boots today, but is wearing a loafer, thinking this may reduce the pressure on his heel.  -LILLIAN    Subjective Pain    Able to rate subjective pain? yes  -LILLIAN    Pre-Treatment Pain Level 4  -LILLIAN    Post-Treatment Pain Level 4  -LILLIAN      18 1600    Subjective Comments    Subjective Comments Pt. says pain is back to original level of intensity.  He is frustrated.    -LILLIAN    Subjective Pain    Able to rate subjective pain? yes  -LILLIAN    Pre-Treatment Pain Level 6  -LILLIAN    Post-Treatment Pain Level 6  -LILLIAN      User Key  (r) = Recorded By, (t) = Taken By, (c) = Cosigned By    Initials Name Provider Type    LILLIAN Perkins, PT Physical Therapist                            PT Assessment/Plan       18 1500       PT Assessment    Assessment Comments Pain decreased compared to last visit, back to 4/10 which has been the lowest pain rating throughout treatment.  -LILLIAN     PT Plan    PT Plan Comments Continue PT.  If tape is helpful, educate pt. in self-taping.  -LILLIAN       User Key  (r) = Recorded By, (t) = Taken By, (c) = Cosigned By    Initials Name Provider Type    LILLIAN SIMPSON" "ELIA Perkins Physical Therapist                    Exercises       01/25/18 1500          Subjective Comments    Subjective Comments Pain back to 4/10 today and it is \"not that throbbing pain\" like it was 2 days ago.  Pt. has resumed wearing night splint and has also tried wearing walking boot when he can.  He is not wearing work boots today, but is wearing a loafer, thinking this may reduce the pressure on his heel.  -LILLIAN      Subjective Pain    Able to rate subjective pain? yes  -LILLIAN      Pre-Treatment Pain Level 4  -LILLIAN      Post-Treatment Pain Level 4  -LILLIAN      Exercise 1    Exercise Name 1 Repeated taping to reduce strain on lateral Achilles insertion.  -LILLIAN        User Key  (r) = Recorded By, (t) = Taken By, (c) = Cosigned By    Initials Name Provider Type    LILLIAN Perkins PT Physical Therapist                        Manual Rx (last 36 hours)      Manual Treatments       01/25/18 1500          Manual Rx 1    Manual Rx 1 Location R Achilles insertion  -LILLIAN      Manual Rx 1 Type Astym  -LILLIAN      Manual Rx 2    Manual Rx 2 Location R calcaneus  -LILLIAN      Manual Rx 2 Type passive eversion to reduce strain on lateral insertion  -LILLIAN        User Key  (r) = Recorded By, (t) = Taken By, (c) = Cosigned By    Initials Name Provider Type    LILLIAN Perkins PT Physical Therapist                             Time Calculation:   Start Time: 1515  Total Timed Code Minutes- PT: 30 minute(s)    Therapy Charges for Today     Code Description Service Date Service Provider Modifiers Qty    72104625626  PT THER PROC EA 15 MIN 1/25/2018 Neris Perkins, PT GP 1    [id number]  PT MANUAL THERAPY EA 15 MIN 1/25/2018 Neris Perkins PT GP 1                    Neris Perkins PT  1/25/2018     "

## 2018-01-30 ENCOUNTER — HOSPITAL ENCOUNTER (OUTPATIENT)
Dept: PHYSICAL THERAPY | Facility: HOSPITAL | Age: 53
Setting detail: THERAPIES SERIES
Discharge: HOME | End: 2018-01-30

## 2018-01-30 DIAGNOSIS — M76.61 TENDONITIS, ACHILLES, RIGHT: Primary | ICD-10-CM

## 2018-01-30 PROCEDURE — 97140 MANUAL THERAPY 1/> REGIONS: CPT | Performed by: PHYSICAL THERAPIST

## 2018-01-30 NOTE — THERAPY TREATMENT NOTE
Outpatient Physical Therapy Ortho Treatment Note  [facility]     Patient Name: Oral Singleton  : 1965  MRN: [id number]  Today's Date: 2018      Visit Date: 2018    Visit Dx:    ICD-10-CM ICD-9-CM   1. Tendonitis, Achilles, right M76.61 726.71       Patient Active Problem List   Diagnosis   • Tendonitis, Achilles, right   • Varicose veins of both lower extremities        Past Medical History:   Diagnosis Date   • No known problems         Past Surgical History:   Procedure Laterality Date   • SHOULDER SURGERY Left     rotator cuff repair             PT Ortho       18 1600    Subjective Comments    Subjective Comments Pt. reports reduction in pain intensity recently.  He has resumed use of walking boot and is sleeping in night splint.  He no longer has the throbbing pain.  The area is  to pressure.  -LILLIAN    Subjective Pain    Able to rate subjective pain? yes  -LILLIAN    Pre-Treatment Pain Level 3  -LILLIAN    Post-Treatment Pain Level 3  -LILLIAN      User Key  (r) = Recorded By, (t) = Taken By, (c) = Cosigned By    Initials Name Provider Type    LILLIAN Perkins PT Physical Therapist                            PT Assessment/Plan       18 1600       PT Assessment    Assessment Comments Lateral Achilles insertion appears less inflammed, not as red as when pt. was wearing work boots.  Calcaneal mobility continues to improve.  -LILLIAN     PT Plan    PT Plan Comments Continue PT.  -LILLIAN       User Key  (r) = Recorded By, (t) = Taken By, (c) = Cosigned By    Initials Name Provider Type    LILLIAN Perkins PT Physical Therapist                Modalities       18 1600          Iontophoresis 75216    Dexamethasone used Yes  -LILLIAN      Patch Type --   Activa  -LILLIAN        User Key  (r) = Recorded By, (t) = Taken By, (c) = Cosigned By    Initials Name Provider Type    LILLIAN Perkins PT Physical Therapist                Exercises       18 1600          Subjective Comments     Subjective Comments Pt. reports reduction in pain intensity recently.  He has resumed use of walking boot and is sleeping in night splint.  He no longer has the throbbing pain.  The area is  to pressure.  -LILLIAN      Subjective Pain    Able to rate subjective pain? yes  -LILLIAN      Pre-Treatment Pain Level 3  -LILLIAN      Post-Treatment Pain Level 3  -LILLIAN        User Key  (r) = Recorded By, (t) = Taken By, (c) = Cosigned By    Initials Name Provider Type    LILLIAN Perkins PT Physical Therapist                        Manual Rx (last 36 hours)      Manual Treatments       01/30/18 1500          Manual Rx 1    Manual Rx 1 Location R Achilles insertion  -LILLIAN      Manual Rx 1 Type Astym  -LILLIAN      Manual Rx 2    Manual Rx 2 Location R calcaneus  -LILLIAN      Manual Rx 2 Type passive eversion to reduce strain on lateral insertion  -LILLIAN        User Key  (r) = Recorded By, (t) = Taken By, (c) = Cosigned By    Initials Name Provider Type    LILLIAN Perkins PT Physical Therapist                             Time Calculation:   Start Time: 1515  Total Timed Code Minutes- PT: 25 minute(s)    Therapy Charges for Today     Code Description Service Date Service Provider Modifiers Qty    [id number]  PT MANUAL THERAPY EA 15 MIN 1/30/2018 Neris Perkins PT GP 2                    Neris Perkins PT  1/30/2018

## 2018-02-01 ENCOUNTER — HOSPITAL ENCOUNTER (OUTPATIENT)
Dept: PHYSICAL THERAPY | Facility: HOSPITAL | Age: 53
Setting detail: THERAPIES SERIES
Discharge: HOME | End: 2018-02-01

## 2018-02-01 DIAGNOSIS — M76.61 TENDONITIS, ACHILLES, RIGHT: Primary | ICD-10-CM

## 2018-02-01 PROCEDURE — 97110 THERAPEUTIC EXERCISES: CPT | Performed by: PHYSICAL THERAPIST

## 2018-02-01 PROCEDURE — 97140 MANUAL THERAPY 1/> REGIONS: CPT | Performed by: PHYSICAL THERAPIST

## 2018-02-01 NOTE — THERAPY TREATMENT NOTE
Outpatient Physical Therapy Ortho Treatment Note  [facility]     Patient Name: Oral Singleton  : 1965  MRN: [id number]  Today's Date: 2018      Visit Date: 2018    Visit Dx:    ICD-10-CM ICD-9-CM   1. Tendonitis, Achilles, right M76.61 726.71       Patient Active Problem List   Diagnosis   • Tendonitis, Achilles, right   • Varicose veins of both lower extremities        Past Medical History:   Diagnosis Date   • No known problems         Past Surgical History:   Procedure Laterality Date   • SHOULDER SURGERY Left     rotator cuff repair             PT Ortho       18 1600    Subjective Comments    Subjective Comments Pain remains mild to moderate.  Pt. is continuing with multiple self-management strategies in addition to treatment in clinic.  -LILLIAN    Subjective Pain    Able to rate subjective pain? yes  -LILLIAN    Pre-Treatment Pain Level 3  -LILLIAN    Post-Treatment Pain Level 3  -LILLIAN      18 1600    Subjective Comments    Subjective Comments Pt. reports reduction in pain intensity recently.  He has resumed use of walking boot and is sleeping in night splint.  He no longer has the throbbing pain.  The area is  to pressure.  -LILLIAN    Subjective Pain    Able to rate subjective pain? yes  -LILLIAN    Pre-Treatment Pain Level 3  -LILLIAN    Post-Treatment Pain Level 3  -LILLIAN      User Key  (r) = Recorded By, (t) = Taken By, (c) = Cosigned By    Initials Name Provider Type    LILLIAN Perkins PT Physical Therapist                            PT Assessment/Plan       18 1600       PT Assessment    Assessment Comments Pain remains mild to moderate with multiple treatment approaches applied.  -LILLIAN     PT Plan    PT Plan Comments Continue PT.  -LILLIAN       User Key  (r) = Recorded By, (t) = Taken By, (c) = Cosigned By    Initials Name Provider Type    LILLIAN Perkins PT Physical Therapist                Modalities       18 1600          Iontophoresis 98671    Dexamethasone used Yes   -LILLIAN      Patch Type --   Activa  -LILLIAN        User Key  (r) = Recorded By, (t) = Taken By, (c) = Cosigned By    Initials Name Provider Type    LILLIAN Perkins PT Physical Therapist                Exercises       02/01/18 1600          Subjective Comments    Subjective Comments Pain remains mild to moderate.  Pt. is continuing with multiple self-management strategies in addition to treatment in clinic.  -LILLIAN      Subjective Pain    Able to rate subjective pain? yes  -LILLIAN      Pre-Treatment Pain Level 3  -LILLIAN      Post-Treatment Pain Level 3  -LILLIAN      Exercise 1    Exercise Name 1 Taping to reduce strain on lateral Achilles insertion.  -LILLIAN        User Key  (r) = Recorded By, (t) = Taken By, (c) = Cosigned By    Initials Name Provider Type    LILLIAN Perkins PT Physical Therapist                        Manual Rx (last 36 hours)      Manual Treatments       02/01/18 1500          Manual Rx 1    Manual Rx 1 Location R Achilles insertion  -LILLIAN      Manual Rx 1 Type Astym  -LILLIAN      Manual Rx 2    Manual Rx 2 Location R calcaneus  -LILLIAN      Manual Rx 2 Type passive eversion to reduce strain on lateral insertion  -LILLIAN        User Key  (r) = Recorded By, (t) = Taken By, (c) = Cosigned By    Initials Name Provider Type    LILLIAN Perkins PT Physical Therapist                             Time Calculation:   Start Time: 1530  Total Timed Code Minutes- PT: 25 minute(s)    Therapy Charges for Today     Code Description Service Date Service Provider Modifiers Qty    95555122927  PT MANUAL THERAPY EA 15 MIN 2/1/2018 Neris Perkins, PT GP 1    [id number]  PT THER PROC EA 15 MIN 2/1/2018 Neris Perkins PT GP 1                    Neris Perkins PT  2/1/2018

## 2018-02-06 ENCOUNTER — HOSPITAL ENCOUNTER (OUTPATIENT)
Dept: PHYSICAL THERAPY | Facility: HOSPITAL | Age: 53
Setting detail: THERAPIES SERIES
Discharge: HOME | End: 2018-02-06

## 2018-02-06 DIAGNOSIS — M76.61 TENDONITIS, ACHILLES, RIGHT: Primary | ICD-10-CM

## 2018-02-06 PROCEDURE — 97110 THERAPEUTIC EXERCISES: CPT | Performed by: PHYSICAL THERAPIST

## 2018-02-06 PROCEDURE — 97140 MANUAL THERAPY 1/> REGIONS: CPT | Performed by: PHYSICAL THERAPIST

## 2018-02-06 NOTE — THERAPY TREATMENT NOTE
Outpatient Physical Therapy Ortho Treatment Note  [facility]     Patient Name: Oral Singleton  : 1965  MRN: [id number]  Today's Date: 2018      Visit Date: 2018    Visit Dx:    ICD-10-CM ICD-9-CM   1. Tendonitis, Achilles, right M76.61 726.71       Patient Active Problem List   Diagnosis   • Tendonitis, Achilles, right   • Varicose veins of both lower extremities        Past Medical History:   Diagnosis Date   • No known problems         Past Surgical History:   Procedure Laterality Date   • SHOULDER SURGERY Left     rotator cuff repair             PT Ortho       18 1500    Subjective Comments    Subjective Comments Pain remains mild.  Pt. continues with self-management strategies.  -LILLIAN    Subjective Pain    Able to rate subjective pain? yes  -LILLIAN    Pre-Treatment Pain Level 3  -LILLIAN    Post-Treatment Pain Level 3  -LILLIAN      User Key  (r) = Recorded By, (t) = Taken By, (c) = Cosigned By    Initials Name Provider Type    LILLIAN Perkins, PT Physical Therapist                            PT Assessment/Plan       18 1500       PT Assessment    Assessment Comments Symptoms mild, stable recently without major exacerbation.  -LILLIAN     PT Plan    PT Plan Comments Continue PT.  Reassess status and assess response to newlt prescribed exercises.  -LILLIAN       User Key  (r) = Recorded By, (t) = Taken By, (c) = Cosigned By    Initials Name Provider Type    LILLIAN Perkins, ELIA Physical Therapist                Modalities       18 1500          Iontophoresis 93089    Rx Minutes 5   R lateral Achilles tendon  -LILLIAN      Dexamethasone used Yes  -LILLIAN      Patch Type --   Activa  -LILLIAN        User Key  (r) = Recorded By, (t) = Taken By, (c) = Cosigned By    Initials Name Provider Type    LILLIAN Perkins PT Physical Therapist                Exercises       18 1500          Subjective Comments    Subjective Comments Pain remains mild.  Pt. continues with self-management strategies.  -LILLIAN       Subjective Pain    Able to rate subjective pain? yes  -LILLIAN      Pre-Treatment Pain Level 3  -LILLIAN      Post-Treatment Pain Level 3  -LILLIAN      Exercise 1    Exercise Name 1 Repeated tapint to reduce strain on lateral Achilles.  Added eccentric strengthening to HEP.  -LILLIAN        User Key  (r) = Recorded By, (t) = Taken By, (c) = Cosigned By    Initials Name Provider Type    LILLIAN Perkins PT Physical Therapist                        Manual Rx (last 36 hours)      Manual Treatments       02/06/18 1500          Manual Rx 1    Manual Rx 1 Location R Achilles   -LILLIAN      Manual Rx 1 Type Astym and STM  -LILLIAN      Manual Rx 2    Manual Rx 2 Location R calcaneus  -LILLIAN      Manual Rx 2 Type passive eversion to reduce strain on lateral insertion  -LILLIAN        User Key  (r) = Recorded By, (t) = Taken By, (c) = Cosigned By    Initials Name Provider Type    LILLIAN Perkins PT Physical Therapist                             Time Calculation:   Start Time: 1520  Total Timed Code Minutes- PT: 25 minute(s)    Therapy Charges for Today     Code Description Service Date Service Provider Modifiers Qty    32269878466  PT THER PROC EA 15 MIN 2/6/2018 Neris Perkins PT GP 1    [id number]  PT MANUAL THERAPY EA 15 MIN 2/6/2018 Neris Perkins PT GP 1                    Neris Perkins PT  2/6/2018

## 2018-02-08 ENCOUNTER — HOSPITAL ENCOUNTER (OUTPATIENT)
Dept: PHYSICAL THERAPY | Facility: HOSPITAL | Age: 53
Setting detail: THERAPIES SERIES
Discharge: HOME | End: 2018-02-08

## 2018-02-08 DIAGNOSIS — M76.61 TENDONITIS, ACHILLES, RIGHT: Primary | ICD-10-CM

## 2018-02-08 PROCEDURE — 97110 THERAPEUTIC EXERCISES: CPT | Performed by: PHYSICAL THERAPIST

## 2018-02-08 PROCEDURE — 97140 MANUAL THERAPY 1/> REGIONS: CPT | Performed by: PHYSICAL THERAPIST

## 2018-02-08 NOTE — THERAPY PROGRESS REPORT/RE-CERT
Outpatient Physical Therapy Ortho Re-Assessment  [facility]     Patient Name: Orla Singleton  : 1965  MRN: [id number]  Today's Date: 2018      Visit Date: 2018    Patient Active Problem List   Diagnosis   • Tendonitis, Achilles, right   • Varicose veins of both lower extremities        Past Medical History:   Diagnosis Date   • No known problems         Past Surgical History:   Procedure Laterality Date   • SHOULDER SURGERY Left     rotator cuff repair       Visit Dx:     ICD-10-CM ICD-9-CM   1. Tendonitis, Achilles, right M76.61 726.71             Patient History       18 1600          Pain     Pain at Present 3  -LILLIAN      Pain at Best 0   at rest  -LILLIAN        User Key  (r) = Recorded By, (t) = Taken By, (c) = Cosigned By    Initials Name Provider Type    LILLIAN Perkins, ELIA Physical Therapist                PT Ortho       18 1600    Subjective Comments    Subjective Comments Pt. reports no change in pain since last session 2 days ago.  He continues self-management strategies.    -LILLIAN    Subjective Pain    Able to rate subjective pain? yes  -LILLIAN    Pre-Treatment Pain Level 3  -LILLIAN    Post-Treatment Pain Level 2  -LILLIAN      18 1500    Subjective Comments    Subjective Comments Pain remains mild.  Pt. continues with self-management strategies.  -LILLIAN    Subjective Pain    Able to rate subjective pain? yes  -LILLIAN    Pre-Treatment Pain Level 3  -LILLIAN    Post-Treatment Pain Level 3  -LILLIAN      User Key  (r) = Recorded By, (t) = Taken By, (c) = Cosigned By    Initials Name Provider Type    LILLIAN Perkins PT Physical Therapist                      Therapy Education  Given: HEP  Program: Reinforced  How Provided: Verbal  Provided to: Patient  Level of Understanding: Verbalized           PT OP Goals       18 1600       PT Short Term Goals    STG Date to Achieve 01/10/18  -     STG 1 Pt. demonstrates independence in HEP.  -LILLIAN     STG 1 Progress Met  -LILLIAN     STG 2 Pt. reports  reduction in pain frequency and intensity compared to baseline levels.  -     STG 2 Progress Met  -     Long Term Goals    LTG Date to Achieve 02/07/18  -     LTG 1 Pt. is independent in ongoing self-management of remaining symptoms.  -LILLIAN     LTG 1 Progress Progressing;Ongoing  -LILLIAN     LTG 2 Achilles pain is occasional and mild.  -LILLIAN     LTG 2 Progress Progressing;Ongoing  -LILLIAN     LTG 3 LEFS score is improved to >60/80.  -     LTG 3 Progress Progressing;Ongoing  -       User Key  (r) = Recorded By, (t) = Taken By, (c) = Cosigned By    Initials Name Provider Type    LILLIAN Perkins PT Physical Therapist                PT Assessment/Plan       02/08/18 1634       PT Assessment    Functional Limitations Limitations in functional capacity and performance  -     Impairments Pain  -     Assessment Comments Pt. indicates that he at times is not aware of pain if at rest.  Pain intensity is decreasing gradually with multiple self-management and clinical interventions.  He will benefit from continuing PT intervention to maximum therapeutic benefit.  -     Please refer to paper survey for additional self-reported information Yes  -LILLIAN     Rehab Potential Good  -LILLIAN     Patient/caregiver participated in establishment of treatment plan and goals Yes  -LILLIAN     Patient would benefit from skilled therapy intervention Yes  -LILLIAN     PT Plan    PT Frequency 2x/week  -     Predicted Duration of Therapy Intervention (days/wks) up to 8 visits  -     Planned CPT's? PT EVAL LOW COMPLEXITY: 39025;PT MANUAL THERAPY EA 15 MIN: 22244;PT NEUROMUSC RE-EDUCATION EA 15 MIN: 56233;PT THER PROC EA 15 MIN: 85659;PT IONTOPHORESIS EA 15 MIN: 47972  -     PT Plan Comments Continue PT per POC.  -       User Key  (r) = Recorded By, (t) = Taken By, (c) = Cosigned By    Initials Name Provider Type    LILLIAN Perkins PT Physical Therapist                Modalities       02/08/18 1600          Iontophoresis 59477    Rx Minutes  5   R lateral Achilles  -LILLIAN      Dexamethasone used Yes  -LILLIAN      Patch Type --   Activa  -LILLIAN        User Key  (r) = Recorded By, (t) = Taken By, (c) = Cosigned By    Initials Name Provider Type    LILLIAN Perkins PT Physical Therapist              Exercises       02/08/18 1600          Subjective Comments    Subjective Comments Pt. reports no change in pain since last session 2 days ago.  He continues self-management strategies.    -LILLIAN      Subjective Pain    Able to rate subjective pain? yes  -LILLIAN      Pre-Treatment Pain Level 3  -LILLIAN      Post-Treatment Pain Level 2  -LILLIAN      Exercise 1    Exercise Name 1 Brief reassessment completed today.  -LILLIAN        User Key  (r) = Recorded By, (t) = Taken By, (c) = Cosigned By    Initials Name Provider Type    LILLIAN Perkins PT Physical Therapist           Manual Rx (last 36 hours)      Manual Treatments       02/08/18 1500          Manual Rx 1    Manual Rx 1 Location R Achilles   -LILLIAN      Manual Rx 1 Type Astym and STM  -LILLIAN      Manual Rx 2    Manual Rx 2 Location R calcaneus  -LILLIAN      Manual Rx 2 Type passive eversion to reduce strain on lateral insertion  -LILLIAN        User Key  (r) = Recorded By, (t) = Taken By, (c) = Cosigned By    Initials Name Provider Type    LILLIAN Perkins PT Physical Therapist                      Outcome Measure Options: Lower Extremity Functional Scale (LEFS)  Lower Extremity Functional Index  Any of your usual work, housework or school activities: A little bit of difficulty  Your usual hobbies, recreational or sporting activities: Extreme difficulty or unable to perform activity  Getting into or out of the bath: No difficulty  Walking between rooms: No difficulty  Putting on your shoes or socks: No difficulty  Squatting: No difficulty  Lifting an object, like a bag of groceries from the floor: No difficulty  Performing light activities around your home: No difficulty  Performing heavy activities around your home: Moderate  difficulty  Getting into or out of a car: No difficulty  Walking 2 blocks: No difficulty  Walking a mile: Extreme difficulty or unable to perform activity  Going up or down 10 stairs (about 1 flight of stairs): No difficulty  Standing for 1 hour: No difficulty  Sitting for 1 hour: No difficulty  Running on even ground: Extreme difficulty or unable to perform activity  Running on uneven ground: Extreme difficulty or unable to perform activity  Making sharp turns while running fast: Extreme difficulty or unable to perform activity  Hopping: Extreme difficulty or unable to perform activity  Rolling over in bed: No difficulty  Total: 53      Time Calculation:   Start Time: 1530  Total Timed Code Minutes- PT: 30 minute(s)     Therapy Charges for Today     Code Description Service Date Service Provider Modifiers Qty    01085404374 HC PT THER PROC EA 15 MIN 2/8/2018 Neris Perkins, PT GP 1    [id number] HC PT MANUAL THERAPY EA 15 MIN 2/8/2018 Nreis Perkins, PT GP 1          PT G-Codes  Outcome Measure Options: Lower Extremity Functional Scale (LEFS)         Neris Perkins PT  2/8/2018

## 2018-02-13 ENCOUNTER — HOSPITAL ENCOUNTER (OUTPATIENT)
Dept: PHYSICAL THERAPY | Facility: HOSPITAL | Age: 53
Setting detail: THERAPIES SERIES
Discharge: HOME | End: 2018-02-13

## 2018-02-13 DIAGNOSIS — M76.61 TENDONITIS, ACHILLES, RIGHT: Primary | ICD-10-CM

## 2018-02-13 PROCEDURE — 97140 MANUAL THERAPY 1/> REGIONS: CPT | Performed by: PHYSICAL THERAPIST

## 2018-02-13 NOTE — THERAPY TREATMENT NOTE
Outpatient Physical Therapy Ortho Treatment Note  [facility]     Patient Name: Oral Singleton  : 1965  MRN: [id number]  Today's Date: 2018      Visit Date: 2018    Visit Dx:    ICD-10-CM ICD-9-CM   1. Tendonitis, Achilles, right M76.61 726.71       Patient Active Problem List   Diagnosis   • Tendonitis, Achilles, right   • Varicose veins of both lower extremities        Past Medical History:   Diagnosis Date   • No known problems         Past Surgical History:   Procedure Laterality Date   • SHOULDER SURGERY Left     rotator cuff repair             PT Ortho       18 1500    Subjective Comments    Subjective Comments Pt. reports persistent mild pain at R lateral Achilles insertion.  He continues with exercise and self-management strategies.  He denies increase in pain with exercise performance.  -LILLIAN    Subjective Pain    Able to rate subjective pain? yes  -LILLIAN    Pre-Treatment Pain Level 3  -LILLIAN    Post-Treatment Pain Level 2  -LILLIAN      User Key  (r) = Recorded By, (t) = Taken By, (c) = Cosigned By    Initials Name Provider Type    LILLIAN Perkins PT Physical Therapist                            PT Assessment/Plan       18 1500       PT Assessment    Assessment Comments Symptoms remain mild and localized.  -LILLIAN     PT Plan    PT Plan Comments Continue PT to maximum benefit.  -LILLIAN       User Key  (r) = Recorded By, (t) = Taken By, (c) = Cosigned By    Initials Name Provider Type    LILLIAN Perkins PT Physical Therapist                    Exercises       18 1500          Subjective Comments    Subjective Comments Pt. reports persistent mild pain at R lateral Achilles insertion.  He continues with exercise and self-management strategies.  He denies increase in pain with exercise performance.  -LILLIAN      Subjective Pain    Able to rate subjective pain? yes  -LILLIAN      Pre-Treatment Pain Level 3  -LILLIAN      Post-Treatment Pain Level 2  -LILLIAN      Exercise 1    Exercise Name 1  Taping to promote calcaneal eversion to reduce strain on lateral Achilles insertion.  -LILLIAN        User Key  (r) = Recorded By, (t) = Taken By, (c) = Cosigned By    Initials Name Provider Type    LILLIAN Perkins PT Physical Therapist                        Manual Rx (last 36 hours)      Manual Treatments       02/13/18 1500          Manual Rx 1    Manual Rx 1 Location R Achilles   -LILLIAN      Manual Rx 1 Type Astym and STM  -LILLIAN      Manual Rx 2    Manual Rx 2 Location R calcaneus  -LILLIAN      Manual Rx 2 Type passive eversion to reduce strain on lateral insertion  -LILLIAN        User Key  (r) = Recorded By, (t) = Taken By, (c) = Cosigned By    Initials Name Provider Type    LILLIAN Perkins PT Physical Therapist                             Time Calculation:   Start Time: 1515  Total Timed Code Minutes- PT: 25 minute(s)    Therapy Charges for Today     Code Description Service Date Service Provider Modifiers Qty    50394495239 HC PT MANUAL THERAPY EA 15 MIN 2/13/2018 Neris Perkins PT GP 2                    Neris Perkins PT  2/13/2018

## 2018-02-15 ENCOUNTER — HOSPITAL ENCOUNTER (OUTPATIENT)
Dept: PHYSICAL THERAPY | Facility: HOSPITAL | Age: 53
Setting detail: THERAPIES SERIES
Discharge: HOME | End: 2018-02-15

## 2018-02-15 DIAGNOSIS — M76.61 TENDONITIS, ACHILLES, RIGHT: Primary | ICD-10-CM

## 2018-02-15 PROCEDURE — 97140 MANUAL THERAPY 1/> REGIONS: CPT | Performed by: PHYSICAL THERAPIST

## 2018-02-15 PROCEDURE — 97110 THERAPEUTIC EXERCISES: CPT | Performed by: PHYSICAL THERAPIST

## 2018-02-15 NOTE — THERAPY TREATMENT NOTE
Outpatient Physical Therapy Ortho Treatment Note  [facility]     Patient Name: Oral Singleton  : 1965  MRN: [id number]  Today's Date: 2/15/2018      Visit Date: 02/15/2018    Visit Dx:    ICD-10-CM ICD-9-CM   1. Tendonitis, Achilles, right M76.61 726.71       Patient Active Problem List   Diagnosis   • Tendonitis, Achilles, right   • Varicose veins of both lower extremities        Past Medical History:   Diagnosis Date   • No known problems         Past Surgical History:   Procedure Laterality Date   • SHOULDER SURGERY Left     rotator cuff repair             PT Ortho       02/15/18 1600    Subjective Comments    Subjective Comments Pain remains mild.  Pt. feels ready to continue with self-management strategies independently.  -LILLIAN    Subjective Pain    Able to rate subjective pain? yes  -LILLIAN    Pre-Treatment Pain Level 3  -LILLIAN    Post-Treatment Pain Level 2  -LILLIAN      18 1500    Subjective Comments    Subjective Comments Pt. reports persistent mild pain at R lateral Achilles insertion.  He continues with exercise and self-management strategies.  He denies increase in pain with exercise performance.  -LILLIAN    Subjective Pain    Able to rate subjective pain? yes  -LILLIAN    Pre-Treatment Pain Level 3  -LILLIAN    Post-Treatment Pain Level 2  -      User Key  (r) = Recorded By, (t) = Taken By, (c) = Cosigned By    Initials Name Provider Type    LILLIAN Perkins PT Physical Therapist                            PT Assessment/Plan       02/15/18 1600       PT Assessment    Assessment Comments Symptoms remain mild.  Pt. is ready to continue with self-management and independent exercise.  -LILLIAN     PT Plan    PT Plan Comments Hold chart active for up to 30 days.  No additional visits are scheduled at this time.  -       User Key  (r) = Recorded By, (t) = Taken By, (c) = Cosigned By    Initials Name Provider Type    LILLIAN Perkins PT Physical Therapist                Modalities       02/15/18 1600           Iontophoresis 52625    Rx Minutes 5  -LILLIAN      Dexamethasone used Yes  -LILLIAN      Patch Type --   Activa  -LILLIAN        User Key  (r) = Recorded By, (t) = Taken By, (c) = Cosigned By    Initials Name Provider Type    LILLIAN Perkins PT Physical Therapist                Exercises       02/15/18 1600          Subjective Comments    Subjective Comments Pain remains mild.  Pt. feels ready to continue with self-management strategies independently.  -LILLIAN      Subjective Pain    Able to rate subjective pain? yes  -LILLIAN      Pre-Treatment Pain Level 3  -LILLIAN      Post-Treatment Pain Level 2  -LILLIAN      Exercise 1    Exercise Name 1 Repeated taping and reviewed technique with patient so that he can continue taping on his own.  He indicates independence in HEP for stretching and strengthening as well.  -LILLIAN        User Key  (r) = Recorded By, (t) = Taken By, (c) = Cosigned By    Initials Name Provider Type    LILLIAN Perkins PT Physical Therapist                        Manual Rx (last 36 hours)      Manual Treatments       02/15/18 1500          Manual Rx 1    Manual Rx 1 Location R Achilles   -LILLIAN      Manual Rx 1 Type Astym and STM  -LILLIAN      Manual Rx 2    Manual Rx 2 Location R calcaneus  -LILLIAN      Manual Rx 2 Type passive eversion to reduce strain on lateral insertion  -LILLIAN        User Key  (r) = Recorded By, (t) = Taken By, (c) = Cosigned By    Initials Name Provider Type    LILLIAN Perkins PT Physical Therapist                PT OP Goals       02/15/18 1600       PT Short Term Goals    STG Date to Achieve 01/10/18  -LILLIAN     STG 1 Pt. demonstrates independence in HEP.  -LILLIAN     STG 1 Progress Met  -LILLIAN     STG 2 Pt. reports reduction in pain frequency and intensity compared to baseline levels.  -     STG 2 Progress Met  -LILLIAN     Long Term Goals    LTG Date to Achieve 02/07/18  -LILLIAN     LTG 1 Pt. is independent in ongoing self-management of remaining symptoms.  -LILLIAN     LTG 1 Progress Met  -LILLIAN     LTG 2 Achilles pain is  occasional and mild.  -LILLIAN     LTG 2 Progress Ongoing  -LILLIAN     LTG 2 Progress Comments Symptoms mild but persistent.  -     LTG 3 LEFS score is improved to >60/80.  -LILLIAN     LTG 3 Progress Not Met  -LILLIAN       User Key  (r) = Recorded By, (t) = Taken By, (c) = Cosigned By    Initials Name Provider Type    LILLIAN Perkins, PT Physical Therapist                         Time Calculation:   Start Time: 1445  Total Timed Code Minutes- PT: 30 minute(s)    Therapy Charges for Today     Code Description Service Date Service Provider Modifiers Qty    58915291761  PT THER PROC EA 15 MIN 2/15/2018 Neris Perkins, PT GP 1    [id number]  PT MANUAL THERAPY EA 15 MIN 2/15/2018 Neris Perkins, PT GP 1                    Neris Perkins PT  2/15/2018

## 2018-03-12 ENCOUNTER — DOCUMENTATION (OUTPATIENT)
Dept: PHYSICAL THERAPY | Facility: HOSPITAL | Age: 53
End: 2018-03-12

## 2018-03-12 DIAGNOSIS — M76.61 TENDONITIS, ACHILLES, RIGHT: Primary | ICD-10-CM

## 2018-03-12 NOTE — THERAPY DISCHARGE NOTE
Outpatient Physical Therapy Discharge Summary         Patient Name: Oral Singleton  : 1965  MRN: [id number]    Today's Date: 3/12/2018    Visit Dx:    ICD-10-CM ICD-9-CM   1. Tendonitis, Achilles, right M76.61 726.71             PT OP Goals     Row Name 18 0900          PT Short Term Goals    STG Date to Achieve 01/10/18  -     STG 1 Pt. demonstrates independence in HEP.  -LILLIAN     STG 1 Progress Met  -     STG 2 Pt. reports reduction in pain frequency and intensity compared to baseline levels.  -     STG 2 Progress Met  -        Long Term Goals    LTG Date to Achieve 18  -LILLIAN     LTG 1 Pt. is independent in ongoing self-management of remaining symptoms.  -     LTG 1 Progress Met  -     LTG 2 Achilles pain is occasional and mild.  -     LTG 2 Progress Ongoing  -     LTG 3 LEFS score is improved to >60/80.  -     LTG 3 Progress Not Met  -       User Key  (r) = Recorded By, (t) = Taken By, (c) = Cosigned By    Initials Name Provider Type    LILLIAN Perkins, PT Physical Therapist                 Time Calculation:                    Neris Perkins, PT  3/12/2018

## 2021-01-11 ENCOUNTER — HOSPITAL ENCOUNTER (EMERGENCY)
Facility: HOSPITAL | Age: 56
Discharge: LEFT BEFORE DISCHARGE | End: 2021-01-11

## 2021-01-11 VITALS
SYSTOLIC BLOOD PRESSURE: 138 MMHG | BODY MASS INDEX: 27.34 KG/M2 | WEIGHT: 213 LBS | TEMPERATURE: 98 F | DIASTOLIC BLOOD PRESSURE: 105 MMHG | HEART RATE: 68 BPM | HEIGHT: 74 IN | OXYGEN SATURATION: 98 % | RESPIRATION RATE: 18 BRPM

## 2021-01-11 LAB
ALBUMIN SERPL-MCNC: 4.1 G/DL (ref 3.5–5.2)
ALBUMIN/GLOB SERPL: 1.4 G/DL
ALP SERPL-CCNC: 67 U/L (ref 39–117)
ALT SERPL W P-5'-P-CCNC: 21 U/L (ref 1–41)
ANION GAP SERPL CALCULATED.3IONS-SCNC: 7 MMOL/L (ref 5–15)
AST SERPL-CCNC: 28 U/L (ref 1–40)
BASOPHILS # BLD AUTO: 0.05 10*3/MM3 (ref 0–0.2)
BASOPHILS NFR BLD AUTO: 0.7 % (ref 0–1.5)
BILIRUB SERPL-MCNC: 0.8 MG/DL (ref 0–1.2)
BUN SERPL-MCNC: 13 MG/DL (ref 6–20)
BUN/CREAT SERPL: 12.5 (ref 7–25)
CALCIUM SPEC-SCNC: 9.3 MG/DL (ref 8.6–10.5)
CHLORIDE SERPL-SCNC: 103 MMOL/L (ref 98–107)
CO2 SERPL-SCNC: 30 MMOL/L (ref 22–29)
CREAT SERPL-MCNC: 1.04 MG/DL (ref 0.76–1.27)
DEPRECATED RDW RBC AUTO: 47 FL (ref 37–54)
EOSINOPHIL # BLD AUTO: 0.12 10*3/MM3 (ref 0–0.4)
EOSINOPHIL NFR BLD AUTO: 1.6 % (ref 0.3–6.2)
ERYTHROCYTE [DISTWIDTH] IN BLOOD BY AUTOMATED COUNT: 13.2 % (ref 12.3–15.4)
GFR SERPL CREATININE-BSD FRML MDRD: 74 ML/MIN/1.73
GLOBULIN UR ELPH-MCNC: 3 GM/DL
GLUCOSE SERPL-MCNC: 82 MG/DL (ref 65–99)
HCT VFR BLD AUTO: 46.5 % (ref 37.5–51)
HGB BLD-MCNC: 15.1 G/DL (ref 13–17.7)
HOLD SPECIMEN: NORMAL
HOLD SPECIMEN: NORMAL
IMM GRANULOCYTES # BLD AUTO: 0.04 10*3/MM3 (ref 0–0.05)
IMM GRANULOCYTES NFR BLD AUTO: 0.5 % (ref 0–0.5)
LIPASE SERPL-CCNC: 29 U/L (ref 13–60)
LYMPHOCYTES # BLD AUTO: 2.68 10*3/MM3 (ref 0.7–3.1)
LYMPHOCYTES NFR BLD AUTO: 35.3 % (ref 19.6–45.3)
MCH RBC QN AUTO: 31.3 PG (ref 26.6–33)
MCHC RBC AUTO-ENTMCNC: 32.5 G/DL (ref 31.5–35.7)
MCV RBC AUTO: 96.5 FL (ref 79–97)
MONOCYTES # BLD AUTO: 0.84 10*3/MM3 (ref 0.1–0.9)
MONOCYTES NFR BLD AUTO: 11.1 % (ref 5–12)
NEUTROPHILS NFR BLD AUTO: 3.87 10*3/MM3 (ref 1.7–7)
NEUTROPHILS NFR BLD AUTO: 50.8 % (ref 42.7–76)
NRBC BLD AUTO-RTO: 0 /100 WBC (ref 0–0.2)
PLATELET # BLD AUTO: 185 10*3/MM3 (ref 140–450)
PMV BLD AUTO: 11.5 FL (ref 6–12)
POTASSIUM SERPL-SCNC: 4 MMOL/L (ref 3.5–5.2)
PROT SERPL-MCNC: 7.1 G/DL (ref 6–8.5)
RBC # BLD AUTO: 4.82 10*6/MM3 (ref 4.14–5.8)
SODIUM SERPL-SCNC: 140 MMOL/L (ref 136–145)
WBC # BLD AUTO: 7.6 10*3/MM3 (ref 3.4–10.8)
WHOLE BLOOD HOLD SPECIMEN: NORMAL
WHOLE BLOOD HOLD SPECIMEN: NORMAL

## 2021-01-11 PROCEDURE — 85025 COMPLETE CBC W/AUTO DIFF WBC: CPT

## 2021-01-11 PROCEDURE — 83690 ASSAY OF LIPASE: CPT

## 2021-01-11 PROCEDURE — 80053 COMPREHEN METABOLIC PANEL: CPT

## 2021-01-11 PROCEDURE — 99211 OFF/OP EST MAY X REQ PHY/QHP: CPT

## 2021-01-11 RX ORDER — SODIUM CHLORIDE 9 MG/ML
10 INJECTION INTRAVENOUS AS NEEDED
Status: DISCONTINUED | OUTPATIENT
Start: 2021-01-11 | End: 2021-01-11 | Stop reason: HOSPADM

## 2021-09-22 ENCOUNTER — TRANSCRIBE ORDERS (OUTPATIENT)
Dept: ADMINISTRATIVE | Facility: HOSPITAL | Age: 56
End: 2021-09-22

## 2021-09-22 ENCOUNTER — HOSPITAL ENCOUNTER (OUTPATIENT)
Dept: GENERAL RADIOLOGY | Facility: HOSPITAL | Age: 56
Discharge: HOME | End: 2021-09-22
Admitting: NURSE PRACTITIONER

## 2021-09-22 DIAGNOSIS — M25.531 ACUTE PAIN OF RIGHT WRIST: Primary | ICD-10-CM

## 2021-09-22 DIAGNOSIS — M25.531 ACUTE PAIN OF RIGHT WRIST: ICD-10-CM

## 2021-09-22 DIAGNOSIS — M25.512 LEFT SHOULDER PAIN, UNSPECIFIED CHRONICITY: Primary | ICD-10-CM

## 2021-09-22 PROCEDURE — 73110 X-RAY EXAM OF WRIST: CPT

## 2021-11-10 ENCOUNTER — HOSPITAL ENCOUNTER (OUTPATIENT)
Dept: MRI IMAGING | Facility: HOSPITAL | Age: 56
Discharge: HOME | End: 2021-11-10
Admitting: FAMILY MEDICINE

## 2021-11-10 DIAGNOSIS — M25.512 LEFT SHOULDER PAIN, UNSPECIFIED CHRONICITY: ICD-10-CM

## 2021-11-10 PROCEDURE — 73221 MRI JOINT UPR EXTREM W/O DYE: CPT

## 2021-11-23 ENCOUNTER — TRANSCRIBE ORDERS (OUTPATIENT)
Dept: ADMINISTRATIVE | Facility: HOSPITAL | Age: 56
End: 2021-11-23

## 2021-11-23 DIAGNOSIS — M25.531 PAIN IN RIGHT WRIST: Primary | ICD-10-CM

## 2021-12-09 ENCOUNTER — TRANSCRIBE ORDERS (OUTPATIENT)
Dept: ADMINISTRATIVE | Facility: HOSPITAL | Age: 56
End: 2021-12-09

## 2021-12-09 DIAGNOSIS — Z11.59 ENCOUNTER FOR SCREENING FOR VIRAL DISEASE: Primary | ICD-10-CM

## 2021-12-21 ENCOUNTER — APPOINTMENT (OUTPATIENT)
Dept: MRI IMAGING | Facility: HOSPITAL | Age: 56
End: 2021-12-21

## 2022-01-04 ENCOUNTER — APPOINTMENT (OUTPATIENT)
Dept: PREADMISSION TESTING | Facility: HOSPITAL | Age: 57
End: 2022-01-04

## 2022-09-02 ENCOUNTER — HOSPITAL ENCOUNTER (OUTPATIENT)
Dept: GENERAL RADIOLOGY | Facility: HOSPITAL | Age: 57
Discharge: HOME | End: 2022-09-02
Admitting: FAMILY MEDICINE

## 2022-09-02 ENCOUNTER — TRANSCRIBE ORDERS (OUTPATIENT)
Dept: ADMINISTRATIVE | Facility: HOSPITAL | Age: 57
End: 2022-09-02

## 2022-09-02 DIAGNOSIS — M79.671 FOOT PAIN, RIGHT: Primary | ICD-10-CM

## 2022-09-02 PROCEDURE — 73630 X-RAY EXAM OF FOOT: CPT

## 2022-09-02 PROCEDURE — 73610 X-RAY EXAM OF ANKLE: CPT

## 2023-03-13 ENCOUNTER — OFFICE VISIT (OUTPATIENT)
Dept: ORTHOPEDIC SURGERY | Facility: CLINIC | Age: 58
End: 2023-03-13
Payer: COMMERCIAL

## 2023-03-13 ENCOUNTER — TELEPHONE (OUTPATIENT)
Dept: ORTHOPEDIC SURGERY | Facility: CLINIC | Age: 58
End: 2023-03-13

## 2023-03-13 VITALS
BODY MASS INDEX: 29.65 KG/M2 | SYSTOLIC BLOOD PRESSURE: 128 MMHG | DIASTOLIC BLOOD PRESSURE: 80 MMHG | WEIGHT: 231 LBS | HEIGHT: 74 IN

## 2023-03-13 DIAGNOSIS — M25.572 LEFT ANKLE PAIN, UNSPECIFIED CHRONICITY: Primary | ICD-10-CM

## 2023-03-13 DIAGNOSIS — R26.89 LIMP: ICD-10-CM

## 2023-03-13 DIAGNOSIS — M76.62 LEFT ACHILLES TENDINITIS: ICD-10-CM

## 2023-03-13 PROCEDURE — 99204 OFFICE O/P NEW MOD 45 MIN: CPT | Performed by: STUDENT IN AN ORGANIZED HEALTH CARE EDUCATION/TRAINING PROGRAM

## 2023-03-13 RX ORDER — METHYLPREDNISOLONE 4 MG/1
TABLET ORAL
Qty: 21 TABLET | Refills: 0 | Status: SHIPPED | OUTPATIENT
Start: 2023-03-13

## 2023-03-13 NOTE — TELEPHONE ENCOUNTER
Caller: Oral Singleton    Relationship to patient: Self    Best call back number: [phone]    Chief complaint: LEFT ACHILLES PAIN    Type of visit: NEW PATIENT    Requested date: TODAY, APPT UNDER 4 HRS IF AVAILABLE.    Additional notes: PATIENT IS SCHEDULED FOR TODAY AT 4PM, BUT WOULD LIKE TO COME IN EARLIER IF AVAILABLE. PLEASE CALL IF HE CAN BE SEEN SOONER.     OKAY TO LVM

## 2023-03-13 NOTE — PROGRESS NOTES
[facility] Office Visit     Office Visit       Date: 03/13/2023   Patient Name: Oral Singleton  MRN: [id number]  YOB: 1965    Referring Physician: Referring, Self     Chief Complaint:   Chief Complaint   Patient presents with   • Left Ankle - Pain       History of Present Illness:   Oral Singleton is a 57 y.o. male who presents with new problem of: left foot pain.  Onset: atraumatic and gradual in nature. The issue has been ongoing for 2 month(s). Pain is a 10/10 on the pain scale. Pain is described as burning, throbbing and stabbing. Associated symptoms include pain, swelling and stiffness. The pain is worse with walking, standing, climbing stairs, sleeping and working; resting improve the pain. Previous treatments have included: bracing.    Subjective   Review of Systems: Review of Systems   Constitutional: Negative for chills, fever, unexpected weight gain and unexpected weight loss.   HENT: Negative for congestion, postnasal drip and rhinorrhea.    Eyes: Negative for blurred vision.   Respiratory: Negative for shortness of breath.    Cardiovascular: Negative for leg swelling.   Gastrointestinal: Negative for abdominal pain, nausea and vomiting.   Genitourinary: Negative for difficulty urinating.   Musculoskeletal: Positive for arthralgias. Negative for gait problem, joint swelling and myalgias.   Skin: Negative for skin lesions and wound.   Neurological: Negative for dizziness, weakness, light-headedness and numbness.   Hematological: Does not bruise/bleed easily.   Psychiatric/Behavioral: Negative for depressed mood.        I have reviewed the following portions of the patient's history:History of Present Illness and review of systems.    Past Medical History:   Past Medical History:   Diagnosis Date   • No known problems    • Right wrist fracture 2022       Past Surgical History:   Past Surgical History:   Procedure Laterality Date   •  "SHOULDER SURGERY Left     rotator cuff repair       Family History:   Family History   Problem Relation Age of Onset   • Cancer Mother    • Cancer Brother        Social History:   Social History     Socioeconomic History   • Marital status:    Tobacco Use   • Smoking status: Never   • Smokeless tobacco: Never   Substance and Sexual Activity   • Alcohol use: Yes     Comment: casual   • Drug use: No   • Sexual activity: Defer       Medications: No current outpatient medications on file.    Allergies: No Known Allergies    I reviewed the patient's chief complaint, history of present illness, review of systems, past medical history, surgical history, family history, social history, medications and allergy list.     Objective    Vital Signs:   Vitals:    03/13/23 1545   BP: 128/80   Weight: 105 kg (231 lb)   Height: 188 cm (74.02\")     Body mass index is 29.65 kg/m².   BMI is >= 25 and <30. (Overweight) The following options were offered after discussion;: exercise counseling/recommendations and nutrition counseling/recommendations     Patient reports that he is a non-smoker and has not ever been a smoker.  This behavior was applauded and he was encouraged to continue in smoking cessation.  We will continue to monitor at subsequent visits.    Ortho Exam:  Constitutional: General Appearance: healthy-appearing, NAD, and normal body habitus.   Psychiatric: Orientation: oriented to time, place, and person. Mood and Affect: normal mood and affect and active and alert.   Cardiovascular System: Arterial Pulses Right: dorsalis pedis normal. Arterial Pulses Left: dorsalis pedis normal. Varicosities Right: capillary refill test normal. Varicosities Left: capillary refill test normal.   Gait and Station: Appearance: limp.   Ankles and Feet: Inspection Right: no erythema, induration, swelling, warmth, or deformity and normal alignment. Inspection Left: no erythema, induration, swelling, warmth, or deformity and normal " alignment. Bony Palpation of the Ankle/Foot Right: no tenderness of the ankle. Bony Palpation of the Ankle/Foot Left: no tenderness of the ankle and tenderness of the achilles tendon insertion. Soft Tissue Palpation of the Ankle/Foot Right: no tenderness of the achilles tendon. Soft Tissue Palpation of the Ankle/Foot Left: tenderness of the achilles tendon. Active Range of Motion Right: dorsiflexion normal. Active Range of Motion Left: dorsiflexion (10 deg.). Stability Right: anterior drawer negative and talar tilt negative. Stability Left: anterior drawer negative and talar tilt negative. Strength Right: extensor digitorum longus (5/5) and brevis (5/5); extensor hallucis longus (5/5); peroneus longus (5/5) and brevis (5/5); and posterior tibialis (5/5), tibialis anterior (5/5), and gastrocnemius (5/5). Strength Left: extensor digitorum longus (5/5) and brevis (5/5); extensor hallucis longus (5/5); peroneus longus (5/5) and brevis (5/5); and posterior tibialis (5/5), tibialis anterior (5/5), and gastrocnemius (5/5).   Neurological System: Sensation on the Right: normal distal extremities. Sensation on the Left: normal distal extremities.   Skin: Right Lower Extremity: normal. Left Lower Extremity: normal.    Results Review:   Imaging Results (Last 24 Hours)     Procedure Component Value Units Date/Time    XR Ankle 3+ View Left [[id number]] Resulted: 03/13/23 1616     Updated: 03/13/23 1617    Narrative:      Indication: Left ankle pain    Views: AP lateral and mortise views of the ankle are submitted.     Impression: The mortise is congruent. There is no widening. There is no   fracture subluxation or dislocation. There are no acute changes.  Plantar   aspect calcaneal enthesophyte seen on the lateral view.    Comparison: No additional images available for comparison review.     XR Calcaneus 2+ View Left [[id number]] Resulted: 03/13/23 1614     Updated: 03/13/23 1616    Narrative:      Indication: Left heel  pain    Views: Axial and lateral views of the calcaneus are submitted.     Impression: There is no fracture subluxation or dislocation. There are no   acute changes.  Prominent plantar calcaneal enthesophyte that is seen on   the lateral view.    Comparison: No additional images available for comparison review.         Procedures    Assessment / Plan    Assessment/Plan:   Diagnoses and all orders for this visit:    1. Left ankle pain, unspecified chronicity (Primary)  -     XR Ankle 3+ View Left  -     XR Calcaneus 2+ View Left    2. Left Achilles tendinitis    3. Limp      2 months of pain over the right heel through the Achilles tendon.  He is most tender through the mid substance of the Achilles tendon.  Pain is severe and he is walking with a considerable limp.  Has had previous symptoms on the right foot and had a walking boot to treat.  He is already gone into the walking boot for the left foot to offload.  This has helped symptoms but he still has considerable pain.  Exam not concerning for Achilles tendon tear.  He has no known mechanism of injury.  His radiographs of the foot and ankle show some swelling at the Achilles tendon but no enthesophyte.  Again, not tender at the insertion onto the calcaneus.  We discussed mid substance Achilles tendinitis and is given options.  I would continue him in the walking boot to offload.  I will start him today on a Medrol Dosepak to reduce his pain and inflammation.  I recommend using the walking boot as long as he is limping.  When he can walk in a regular shoe without a limp, he is free to come out of the boot.  I will see him back in 4 weeks to monitor his response and decide on additional treatment options.    Previous documentation reviewed: 11/17/2017-office visit-FRANCY Mera.    Previous laboratory results reviewed: 1/11/2021-creatinine 1.04, EGFR 74.    Previous imaging studies reviewed: 9/2/2022-radiographs of the right foot.    Follow Up:   Return in  about 4 weeks (around 4/10/2023) for Recheck.      Dimas Damian MD  [facility]

## 2024-10-19 ENCOUNTER — PATIENT ROUNDING (BHMG ONLY) (OUTPATIENT)
Dept: URGENT CARE | Facility: CLINIC | Age: 59
End: 2024-10-19
Payer: COMMERCIAL